# Patient Record
Sex: FEMALE | Race: WHITE | Employment: FULL TIME | URBAN - METROPOLITAN AREA
[De-identification: names, ages, dates, MRNs, and addresses within clinical notes are randomized per-mention and may not be internally consistent; named-entity substitution may affect disease eponyms.]

---

## 2022-07-08 NOTE — PROGRESS NOTES
Tiffany Chen, APRN-CNP  [facility]  [address] [address], [address]  [facility] [address]  Dept: [phone]  Dept Fax: [phone]     Patient ID: Ale Urbina is a 50 y.o. female. HPI    Ale Urbina is a 50 y.o. female New patient who presents to the office today for a first visit and to establish a relationship with a new primary care provider. Previous PCP: Lu Salazar NP  last seen: 2021    Today, the patient complains of left side of bra line and back, it is resolving about 5 weeks now. She thought maybe bed bugs, but after talking with someone and seeing the shape and size it was said to be shingles. She no longer has the pain and itching like she did before. It is getting better daily. - currently goes through menopause having really bad hot flashes happening almosts hourly she is taking lexapro, she is always feeling really bad. menstrual cycle every 3 months    Preventative care, female:  Last colonoscopy: [facility] years ago  Last mammogram: 2022 [facility]  Last PAP: Amrit Alcantar NP  Nicotine use: never  Alcohol use: yes, socially  Drug use: never  Dental exam: every 6 months   Eye exam: within 1 year     Specialists:  Endocrinology - Dr. Rosalee Millan     Previous office notes, labs, imaging and hospital records were reviewed prior to and during encounter. The patient's past medical, surgical, social, and family history as well as her current medications and allergies were reviewed as documented in today's encounter PASTOR Fuentes. No current outpatient medications on file prior to visit. No current facility-administered medications on file prior to visit. Subjective:     Review of Systems   Constitutional: Negative for activity change, fatigue and fever. Hot flashes   HENT: Negative for congestion, ear pain, rhinorrhea and sore throat. Respiratory: Negative for cough, chest tightness and shortness of breath. Cardiovascular: Negative for chest pain and palpitations. Gastrointestinal: Negative for abdominal distention, abdominal pain, constipation, diarrhea and nausea. Endocrine: Negative for polydipsia, polyphagia and polyuria. Genitourinary: Negative for difficulty urinating and dysuria. Musculoskeletal: Negative for arthralgias, back pain and myalgias. Skin: Negative for color change and rash. Neurological: Negative for dizziness, weakness, light-headedness and headaches. Hematological: Negative for adenopathy. Psychiatric/Behavioral: Positive for sleep disturbance (trouble staying asleep). Negative for agitation and behavioral problems. The patient is not nervous/anxious. Vitals:    07/12/22 0728   BP: 98/64   Pulse: 52   Resp: 16   Temp: 97.5 °F (36.4 °C)   SpO2: 99%     Objective:     Physical Exam  Vitals reviewed. Constitutional:       General: She is not in acute distress. Appearance: Normal appearance. HENT:      Head: Normocephalic and atraumatic. Right Ear: External ear normal.      Left Ear: External ear normal.      Nose: Nose normal.      Mouth/Throat:      Mouth: Mucous membranes are moist.      Pharynx: No oropharyngeal exudate or posterior oropharyngeal erythema. Eyes:      Extraocular Movements: Extraocular movements intact. Conjunctiva/sclera: Conjunctivae normal.      Pupils: Pupils are equal, round, and reactive to light. Cardiovascular:      Rate and Rhythm: Normal rate and regular rhythm. Pulses: Normal pulses. Heart sounds: Normal heart sounds. No murmur heard. Pulmonary:      Effort: Pulmonary effort is normal. No respiratory distress. Breath sounds: Normal breath sounds. No wheezing or rales. Abdominal:      General: Bowel sounds are normal. There is no distension. Palpations: Abdomen is soft. Tenderness: There is no abdominal tenderness. Musculoskeletal:         General: Normal range of motion.       Cervical back: Normal range of motion. Right lower leg: No edema. Left lower leg: No edema. Lymphadenopathy:      Cervical: No cervical adenopathy. Skin:     General: Skin is warm and dry. Findings: Rash present. Rash is vesicular. Neurological:      General: No focal deficit present. Mental Status: She is alert and oriented to person, place, and time. Deep Tendon Reflexes: Reflexes normal.   Psychiatric:         Mood and Affect: Mood normal.         Behavior: Behavior normal. Behavior is cooperative. Assessment:      Diagnosis Orders   1. Preventative health care  CBC    Comprehensive Metabolic Panel    Hemoglobin A1C    Lipid Panel   2. Vitamin D deficiency     3. Psychophysiological insomnia     4. Need for shingles vaccine  zoster recombinant adjuvanted vaccine [facility]) 50 MCG/0.5ML SUSR injection    DISCONTINUED: zoster recombinant adjuvanted vaccine [facility]) 50 MCG/0.5ML SUSR injection    DISCONTINUED: zoster recombinant adjuvanted vaccine (SHINGRIX) 50 MCG/0.5ML SUSR injection   5. Other specified hypothyroidism     6. Hot flashes     7. Herpes zoster without complication       Plan:     Vitamin D deficiency  - Stable: Medication re-filled as needed, con't medications as prescribed, con't current tx plan  - Continue Vitamin D supplements as ordered, will continue to follow Vitamin D level  - Please increase foods with Vitamin D, which include cheese, eggs, cereals, yogurt, milk, tofu, any type of beef, salmon or tuna,  spinach, and mushroom. - continue to follow up with Dr. Pam Gallagher. Psychophysiological insomnia  Hot flashes  - continue lexapro as ordered. - discussed trying magnesium glycinate for sleeping and primrose in the evening for hot flashes. Need for shingles vaccine  - pt instructed to wait at least 30 days from time rash has completely cleared. - zoster recombinant adjuvanted vaccine [facility]) 50 MCG/0.5ML SUSR injection;  Inject 0.5 mLs into the muscle See Admin Instructions 1 dose now and repeat in 2-6 months  Dispense: 0.5 mL; Refill: 1    Other specified hypothyroidism  - Stable: Medication re-filled as needed, con't medications as prescribed, con't current tx plan  - Continue with synthroid as previously prescribed. - continue to follow up with Dr. Nikolay Billingsley as recommended. Preventative health care  - We did discuss the recommended preventative screening guidelines including routine dental and eye exams.   - Detailed education was provided on the patient's after visit summary.  - Will order above noted labs and discuss them at follow up visit. - Will cont to follow with  Ob/GYN as instructed for routine PAP. - Annual mammograms as recommended. Herpes Zoster  - Keep covered and away from anyone pregnant   - Keep away from young children, until the rash crusts over. I did state they could have the potential to get chicken pox even though they were vaccinated    - pt verbalized understanding plan of care. Medications, labs, diagnostic studies, consultations and follow-up as documented in this encounter. Rest of systems unchanged, continue current treatments. On this date 7/12/2022 I have spent 30 minutes reviewing previous notes, test results and face to face with the patient discussing the diagnosis and importance of compliance with the treatment plan as well as documenting on the day of the visit. Noah Blair.  APRN-CNP

## 2022-07-12 ENCOUNTER — OFFICE VISIT (OUTPATIENT)
Dept: FAMILY MEDICINE CLINIC | Age: 52
End: 2022-07-12
Payer: COMMERCIAL

## 2022-07-12 VITALS
TEMPERATURE: 97.5 F | DIASTOLIC BLOOD PRESSURE: 64 MMHG | HEIGHT: 68 IN | RESPIRATION RATE: 16 BRPM | SYSTOLIC BLOOD PRESSURE: 98 MMHG | BODY MASS INDEX: 21.22 KG/M2 | OXYGEN SATURATION: 99 % | WEIGHT: 140 LBS | HEART RATE: 52 BPM

## 2022-07-12 DIAGNOSIS — Z23 NEED FOR SHINGLES VACCINE: ICD-10-CM

## 2022-07-12 DIAGNOSIS — B02.9 HERPES ZOSTER WITHOUT COMPLICATION: ICD-10-CM

## 2022-07-12 DIAGNOSIS — Z00.00 PREVENTATIVE HEALTH CARE: Primary | ICD-10-CM

## 2022-07-12 DIAGNOSIS — E55.9 VITAMIN D DEFICIENCY: ICD-10-CM

## 2022-07-12 DIAGNOSIS — R23.2 HOT FLASHES: ICD-10-CM

## 2022-07-12 DIAGNOSIS — E03.8 OTHER SPECIFIED HYPOTHYROIDISM: ICD-10-CM

## 2022-07-12 DIAGNOSIS — F51.04 PSYCHOPHYSIOLOGICAL INSOMNIA: ICD-10-CM

## 2022-07-12 PROBLEM — F32.A ANXIETY AND DEPRESSION: Status: ACTIVE | Noted: 2019-09-18

## 2022-07-12 PROBLEM — F41.9 ANXIETY AND DEPRESSION: Status: ACTIVE | Noted: 2019-09-18

## 2022-07-12 PROCEDURE — 99386 PREV VISIT NEW AGE 40-64: CPT | Performed by: NURSE PRACTITIONER

## 2022-07-12 RX ORDER — ZOSTER VACCINE RECOMBINANT, ADJUVANTED 50 MCG/0.5
0.5 KIT INTRAMUSCULAR SEE ADMIN INSTRUCTIONS
Qty: 0.5 ML | Refills: 1 | Status: SHIPPED | OUTPATIENT
Start: 2022-07-12 | End: 2022-07-12

## 2022-07-12 RX ORDER — LEVOTHYROXINE SODIUM 0.05 MG/1
50 TABLET ORAL DAILY
COMMUNITY
Start: 2022-06-11

## 2022-07-12 RX ORDER — ZOSTER VACCINE RECOMBINANT, ADJUVANTED 50 MCG/0.5
0.5 KIT INTRAMUSCULAR SEE ADMIN INSTRUCTIONS
Qty: 0.5 ML | Refills: 1 | Status: SHIPPED | OUTPATIENT
Start: 2022-07-12 | End: 2023-01-08

## 2022-07-12 RX ORDER — ESCITALOPRAM OXALATE 10 MG/1
5 TABLET ORAL DAILY
COMMUNITY
Start: 2022-06-16 | End: 2022-09-20 | Stop reason: SDUPTHER

## 2022-07-12 SDOH — ECONOMIC STABILITY: FOOD INSECURITY: WITHIN THE PAST 12 MONTHS, YOU WORRIED THAT YOUR FOOD WOULD RUN OUT BEFORE YOU GOT MONEY TO BUY MORE.: NEVER TRUE

## 2022-07-12 SDOH — ECONOMIC STABILITY: FOOD INSECURITY: WITHIN THE PAST 12 MONTHS, THE FOOD YOU BOUGHT JUST DIDN'T LAST AND YOU DIDN'T HAVE MONEY TO GET MORE.: NEVER TRUE

## 2022-07-12 ASSESSMENT — PATIENT HEALTH QUESTIONNAIRE - PHQ9
9. THOUGHTS THAT YOU WOULD BE BETTER OFF DEAD, OR OF HURTING YOURSELF: 0
SUM OF ALL RESPONSES TO PHQ QUESTIONS 1-9: 0
1. LITTLE INTEREST OR PLEASURE IN DOING THINGS: 0
2. FEELING DOWN, DEPRESSED OR HOPELESS: 0
7. TROUBLE CONCENTRATING ON THINGS, SUCH AS READING THE NEWSPAPER OR WATCHING TELEVISION: 0
SUM OF ALL RESPONSES TO PHQ QUESTIONS 1-9: 0
SUM OF ALL RESPONSES TO PHQ9 QUESTIONS 1 & 2: 0
SUM OF ALL RESPONSES TO PHQ QUESTIONS 1-9: 0
10. IF YOU CHECKED OFF ANY PROBLEMS, HOW DIFFICULT HAVE THESE PROBLEMS MADE IT FOR YOU TO DO YOUR WORK, TAKE CARE OF THINGS AT HOME, OR GET ALONG WITH OTHER PEOPLE: 0
4. FEELING TIRED OR HAVING LITTLE ENERGY: 0
5. POOR APPETITE OR OVEREATING: 0
6. FEELING BAD ABOUT YOURSELF - OR THAT YOU ARE A FAILURE OR HAVE LET YOURSELF OR YOUR FAMILY DOWN: 0
SUM OF ALL RESPONSES TO PHQ QUESTIONS 1-9: 0
8. MOVING OR SPEAKING SO SLOWLY THAT OTHER PEOPLE COULD HAVE NOTICED. OR THE OPPOSITE, BEING SO FIGETY OR RESTLESS THAT YOU HAVE BEEN MOVING AROUND A LOT MORE THAN USUAL: 0
3. TROUBLE FALLING OR STAYING ASLEEP: 0

## 2022-07-12 ASSESSMENT — ENCOUNTER SYMPTOMS
SHORTNESS OF BREATH: 0
COUGH: 0
NAUSEA: 0
CHEST TIGHTNESS: 0
ABDOMINAL PAIN: 0
SORE THROAT: 0
BACK PAIN: 0
ABDOMINAL DISTENTION: 0
COLOR CHANGE: 0
RHINORRHEA: 0
CONSTIPATION: 0
DIARRHEA: 0

## 2022-07-12 ASSESSMENT — SOCIAL DETERMINANTS OF HEALTH (SDOH): HOW HARD IS IT FOR YOU TO PAY FOR THE VERY BASICS LIKE FOOD, HOUSING, MEDICAL CARE, AND HEATING?: NOT HARD AT ALL

## 2022-07-12 NOTE — PATIENT INSTRUCTIONS
Health Maintenance Recommendations  Exercise   · I generally recommend that people of all ages try to get 150 minutes of physical activity per week and it doesnt matter how this totals up, in other words 30 minutes 5 days per week is as good as 50 minutes 3 days a week and so on. · The level of activity should be such that it is able to get your heart rate up to 100 or more, for example a brisk walk should achieve this rate. Dietary Recommendations  · In terms of diet, I generally recommend trying to eat a healthy well balanced diet full of fruits and vegetables. Avoid carbonated drinks and fruit juices and limit your alcohol use. · Avoid processed foods wherever possible (anything that comes in a can or a box) which can be achieved by sticking to the outside walls of the grocery store where generally you will find fresh fruits/vegetables, meats, dairy, and frozen foods. · Try to avoid starches in the diet where possible and minimize bread, rice, potatoes, and pasta in the diet. Specifically try to avoid gluten, which even in people that dont have a vinod allergy, causes havoc in the small intestine and alters absorption of nutrients which can in turn lead to obesity. Sleep  · Try to achieve a regular sleep schedule, waking and laying down at the same time each night. Most people need 7 hours per night plus or minus 2 hours. · You will know that youre getting enough because you will wake feeling refreshed and not need to sleep in to catch up on weekends. Skin Care  · Make sure that you dont neglect your skin. · Play it safe in the sun. Use a sunblock on all of your exposed skin. · The sunblock should be broad spectrum and water resistant. · I do recommend an SPF 30 or higher sun screen any time that you plan to be in the sun for more than 20 minutes, even in the winter or on cloudy days (keep in mind that UV light penetrates clouds and can cause burns even on cloudy days).    · Apply 20 to 30 minutes before going out in the sun. Reapply sunblock every 2 hours and after swimming or sweating. Judyn Rafat can also damage your skin on cool, windy days. Clouds and fog do not filter UV light. Make sure you reapply sunblock every 2 hours. · Avoid the sun in the middle of the day, between 10 AM to 4 PM. Your unprotected skin can be damaged in 15 minutes with direct sun exposure. Personal Health  · If you smoke, STOP. There are many resources available to help you successfully quit. · If you are sexually active, always practice safe sex and wear a condom. · See a dentist every 6 months. · See an eye doctor regularly. · Always wear a seat belt while in car. · Get a flu vaccine annually. · Get a tetanus booster vaccine every 10 years. Psychosocial Health  · Finally, make sure that you always have something to look forward to whether this is a vacation, a special event, or just a weekend off work. Having something to look forward to helps to maintain positive focus and is good for mental health.

## 2022-07-19 LAB
ALBUMIN SERPL-MCNC: 4.7 G/DL
ALP BLD-CCNC: 75 U/L
ALT SERPL-CCNC: 12 U/L
ANION GAP SERPL CALCULATED.3IONS-SCNC: 9 MMOL/L
AST SERPL-CCNC: 18 U/L
AVERAGE GLUCOSE: 108
BASOPHILS ABSOLUTE: NORMAL
BASOPHILS RELATIVE PERCENT: NORMAL
BILIRUB SERPL-MCNC: 0.5 MG/DL (ref 0.1–1.4)
BUN BLDV-MCNC: 17 MG/DL
CALCIUM SERPL-MCNC: 9.9 MG/DL
CHLORIDE BLD-SCNC: 104 MMOL/L
CHOLESTEROL, TOTAL: 193 MG/DL
CHOLESTEROL/HDL RATIO: 3.2
CO2: 28 MMOL/L
CREAT SERPL-MCNC: 0.81 MG/DL
EOSINOPHILS ABSOLUTE: NORMAL
EOSINOPHILS RELATIVE PERCENT: NORMAL
GFR CALCULATED: 88
GLUCOSE BLD-MCNC: 91 MG/DL
HBA1C MFR BLD: 5.4 %
HCT VFR BLD CALC: 37.9 % (ref 36–46)
HDLC SERPL-MCNC: 60 MG/DL (ref 35–70)
HEMOGLOBIN: 12.5 G/DL (ref 12–16)
LDL CHOLESTEROL CALCULATED: 119 MG/DL (ref 0–160)
LYMPHOCYTES ABSOLUTE: NORMAL
LYMPHOCYTES RELATIVE PERCENT: NORMAL
MCH RBC QN AUTO: NORMAL PG
MCHC RBC AUTO-ENTMCNC: NORMAL G/DL
MCV RBC AUTO: NORMAL FL
MONOCYTES ABSOLUTE: NORMAL
MONOCYTES RELATIVE PERCENT: NORMAL
NEUTROPHILS ABSOLUTE: NORMAL
NEUTROPHILS RELATIVE PERCENT: NORMAL
NONHDLC SERPL-MCNC: NORMAL MG/DL
PLATELET # BLD: 246 K/ΜL
PMV BLD AUTO: NORMAL FL
POTASSIUM SERPL-SCNC: 4.8 MMOL/L
RBC # BLD: NORMAL 10*6/UL
SODIUM BLD-SCNC: 141 MMOL/L
TOTAL PROTEIN: 7.3
TRIGL SERPL-MCNC: 69 MG/DL
VLDLC SERPL CALC-MCNC: 14 MG/DL
WBC # BLD: 3.5 10^3/ML

## 2022-07-21 DIAGNOSIS — Z00.00 PREVENTATIVE HEALTH CARE: ICD-10-CM

## 2022-09-20 RX ORDER — ESCITALOPRAM OXALATE 10 MG/1
5 TABLET ORAL DAILY
Qty: 30 TABLET | Refills: 2 | Status: SHIPPED | OUTPATIENT
Start: 2022-09-20 | End: 2022-10-18 | Stop reason: DRUGHIGH

## 2022-10-18 ENCOUNTER — TELEPHONE (OUTPATIENT)
Dept: FAMILY MEDICINE CLINIC | Age: 52
End: 2022-10-18

## 2022-10-18 RX ORDER — ESCITALOPRAM OXALATE 10 MG/1
10 TABLET ORAL DAILY
Qty: 30 TABLET | Refills: 2 | Status: SHIPPED | OUTPATIENT
Start: 2022-10-18 | End: 2022-10-31 | Stop reason: SDUPTHER

## 2022-10-18 NOTE — TELEPHONE ENCOUNTER
Yes she can take 1 tablet daily and then I also sent over an updated prescription if needed.  Please have her schedule a virtual visit in 4-6 weeks to see how she is doing with the increase dosage

## 2022-10-18 NOTE — TELEPHONE ENCOUNTER
Patient called in and states that she has been taking 0.5 tablet of the lexapro. She is wanting to know if she would be able to increase that and take a full tablet.

## 2022-10-31 RX ORDER — ESCITALOPRAM OXALATE 10 MG/1
10 TABLET ORAL DAILY
Qty: 30 TABLET | Refills: 2 | Status: SHIPPED | OUTPATIENT
Start: 2022-10-31 | End: 2022-11-23 | Stop reason: SDUPTHER

## 2022-10-31 NOTE — TELEPHONE ENCOUNTER
Please Approve or Refuse.   Send to Pharmacy per Pt's Request:      Next Visit Date:  11/23/2022   Last Visit Date: 7/12/2022    Hemoglobin A1C (%)   Date Value   07/19/2022 5.4             ( goal A1C is < 7)   BP Readings from Last 3 Encounters:   07/12/22 98/64          (goal 120/80)  BUN   Date Value Ref Range Status   07/19/2022 17 mg/dL Final     Creatinine   Date Value Ref Range Status   07/19/2022 0.81  Final     Potassium   Date Value Ref Range Status   07/19/2022 4.8 mmol/L Final

## 2022-11-22 NOTE — PROGRESS NOTES
Cecilio Shi, APRN-CNP  [facility]  [address] [address], [address]  [facility] [address]  Dept: [phone]  Dept Fax: [phone]     Patient ID: Shawnee Izaguirre is a 50 y.o. female Established patient    HPI    Pt here today for follow up on anxiety, depression, hot flashes and medication check. She is doing well on the lexapro 10 mg and would like to keep it at that. It has helped with her hot flashes.     - she is having a lot of aches in all joints, fatigue and has tried supplements but they are working.   - when she was pregnant with dtr she was diagnosis with rheumatoid arthritis, she was told she had the symptoms of it but the labs did not show positive. she was put on medication (methotrexate and something else) but was afraid of long term effects so stopped taking them, pt states she was doing well but seems to be getting out of control again. She is also noticing  brain fog. She recently got Thyroid check and has appt next week for that. - she had to stop running due to the pain in lower extremities. Pt denies any fever or chills. Pt today denies any HA, chest pain, or SOB. Pt denies any N/V/D/C or abdominal pain. Otherwise pt doing well on current tx and no other concerns today. The patient's past medical, surgical, social, and family history as well as his current medications and allergies were reviewed as documented in today's encounter by PASTOR Ricketts. Previous office notes, labs, imaging and hospital records were reviewed prior to and during encounter.     Current Outpatient Medications on File Prior to Visit   Medication Sig Dispense Refill    escitalopram (LEXAPRO) 10 MG tablet Take 1 tablet by mouth daily 30 tablet 2    levothyroxine (SYNTHROID) 50 MCG tablet Take 50 mcg by mouth daily      zoster recombinant adjuvanted vaccine (SHINGRIX) 50 MCG/0.5ML SUSR injection Inject 0.5 mLs into the muscle See Admin Instructions 1 dose now and repeat in 2-6 months 0.5 mL 1     No current facility-administered medications on file prior to visit. Subjective:     Review of Systems   Constitutional:  Negative for activity change, fatigue and fever. HENT:  Negative for congestion, ear pain, rhinorrhea and sore throat. Respiratory:  Negative for cough, chest tightness and shortness of breath. Cardiovascular:  Negative for chest pain and palpitations. Gastrointestinal:  Negative for abdominal distention, abdominal pain, constipation, diarrhea and nausea. Endocrine: Negative for polydipsia, polyphagia and polyuria. Genitourinary:  Negative for difficulty urinating and dysuria. Musculoskeletal:  Positive for arthralgias and myalgias. Negative for back pain and joint swelling. Skin:  Negative for color change and rash. Neurological:  Negative for dizziness, weakness, light-headedness and headaches. Hematological:  Negative for adenopathy. Psychiatric/Behavioral:  Negative for agitation and behavioral problems. The patient is not nervous/anxious. Vitals:    11/23/22 0848   BP: 114/72   Pulse: 72   Resp: 16   Temp: 97.6 °F (36.4 °C)   SpO2: 96%       Objective:     Physical Exam  Vitals reviewed. Constitutional:       General: She is not in acute distress. Appearance: Normal appearance. HENT:      Head: Normocephalic and atraumatic. Right Ear: External ear normal.      Left Ear: External ear normal.      Nose: Nose normal.      Mouth/Throat:      Mouth: Mucous membranes are moist.      Pharynx: No oropharyngeal exudate or posterior oropharyngeal erythema. Eyes:      Extraocular Movements: Extraocular movements intact. Conjunctiva/sclera: Conjunctivae normal.      Pupils: Pupils are equal, round, and reactive to light. Cardiovascular:      Rate and Rhythm: Normal rate and regular rhythm. Pulses: Normal pulses. Heart sounds: Normal heart sounds. No murmur heard.   Pulmonary:      Effort: Pulmonary effort is normal. No respiratory distress. Breath sounds: Normal breath sounds. No wheezing or rales. Abdominal:      General: Bowel sounds are normal. There is no distension. Palpations: Abdomen is soft. Tenderness: There is no abdominal tenderness. Musculoskeletal:         General: Normal range of motion. Cervical back: Normal range of motion. Right lower leg: No edema. Left lower leg: No edema. Comments: Pain/ tenderness to back of hip joint, elbows, wrist, ankles and knees   Lymphadenopathy:      Cervical: No cervical adenopathy. Skin:     General: Skin is warm and dry. Neurological:      General: No focal deficit present. Mental Status: She is alert and oriented to person, place, and time. Deep Tendon Reflexes: Reflexes normal.   Psychiatric:         Mood and Affect: Mood normal.         Behavior: Behavior normal. Behavior is cooperative. Assessment:      Diagnosis Orders   1. Psychophysiological insomnia        2. Anxiety and depression  escitalopram (LEXAPRO) 10 MG tablet      3. Hot flashes        4. Preventative health care  CBC    Comprehensive Metabolic Panel    Lipid Panel      5. Vitamin D deficiency  Vitamin D 25 Hydroxy      6. Need for influenza vaccination  Influenza, FLUCELVAX, (age 10 mo+), IM, Preservative Free, 0.5 mL      7. Arthralgia, unspecified joint  MEHUL Screen with Reflex    C-Reactive Protein    Cyclic Citrul Peptide Antibody, IgG    Rheumatoid Factor    Sedimentation Rate    HLA-B27 Antigen          Plan:     Psychophysiological insomnia  Hot flashes  Anxiety and depression  - Stable: Medication re-filled as needed, con't medications as prescribed, con't current tx plan  - Continue with lexapro as previously prescribed. - Pt encouraged to deep breath and relax through anxious moments   - Offered reassurance and allowed to ventilate feelings and ask questions.   - Non-pharmological coping methods discussed.     - discussed trying magnesium glycinate for sleeping and primrose in the evening for hot flashes. Vitamin D deficiency   - Continue Vitamin D supplements as ordered, will re-check Vitamin D level    Need for influenza vaccination  After obtaining informed consent, the immunization is given by Manning Opitz, RMA. Arthralgia, unspecified joint  - will get labs to further evaluate. - pt verbalized understanding plan of care. Medications, labs, diagnostic studies, consultations and follow-up as documented in this encounter. Rest of systems unchanged, continue current treatments    On this date 11/23/2022 I have spent 30 minutes reviewing previous notes, test results and face to face with the patient discussing the diagnosis and importance of compliance with the treatment plan as well as documenting on the day of the visit.     KAELA Mazariegos-CNP

## 2022-11-23 ENCOUNTER — OFFICE VISIT (OUTPATIENT)
Dept: FAMILY MEDICINE CLINIC | Age: 52
End: 2022-11-23
Payer: COMMERCIAL

## 2022-11-23 VITALS
DIASTOLIC BLOOD PRESSURE: 72 MMHG | WEIGHT: 146 LBS | SYSTOLIC BLOOD PRESSURE: 114 MMHG | RESPIRATION RATE: 16 BRPM | TEMPERATURE: 97.6 F | BODY MASS INDEX: 22.2 KG/M2 | OXYGEN SATURATION: 96 % | HEART RATE: 72 BPM

## 2022-11-23 DIAGNOSIS — Z23 NEED FOR INFLUENZA VACCINATION: ICD-10-CM

## 2022-11-23 DIAGNOSIS — F51.04 PSYCHOPHYSIOLOGICAL INSOMNIA: Primary | ICD-10-CM

## 2022-11-23 DIAGNOSIS — E55.9 VITAMIN D DEFICIENCY: ICD-10-CM

## 2022-11-23 DIAGNOSIS — R23.2 HOT FLASHES: ICD-10-CM

## 2022-11-23 DIAGNOSIS — F41.9 ANXIETY AND DEPRESSION: ICD-10-CM

## 2022-11-23 DIAGNOSIS — F32.A ANXIETY AND DEPRESSION: ICD-10-CM

## 2022-11-23 DIAGNOSIS — Z00.00 PREVENTATIVE HEALTH CARE: ICD-10-CM

## 2022-11-23 DIAGNOSIS — M25.50 ARTHRALGIA, UNSPECIFIED JOINT: ICD-10-CM

## 2022-11-23 PROCEDURE — 90471 IMMUNIZATION ADMIN: CPT | Performed by: NURSE PRACTITIONER

## 2022-11-23 PROCEDURE — 99214 OFFICE O/P EST MOD 30 MIN: CPT | Performed by: NURSE PRACTITIONER

## 2022-11-23 PROCEDURE — 90674 CCIIV4 VAC NO PRSV 0.5 ML IM: CPT | Performed by: NURSE PRACTITIONER

## 2022-11-23 RX ORDER — ESCITALOPRAM OXALATE 10 MG/1
10 TABLET ORAL DAILY
Qty: 90 TABLET | Refills: 1 | Status: SHIPPED | OUTPATIENT
Start: 2022-11-23

## 2022-11-23 ASSESSMENT — ENCOUNTER SYMPTOMS
BACK PAIN: 0
RHINORRHEA: 0
SORE THROAT: 0
COUGH: 0
COLOR CHANGE: 0
ABDOMINAL DISTENTION: 0
DIARRHEA: 0
CHEST TIGHTNESS: 0
ABDOMINAL PAIN: 0
CONSTIPATION: 0
SHORTNESS OF BREATH: 0
NAUSEA: 0

## 2022-12-10 LAB
ALBUMIN SERPL-MCNC: 4.9 G/DL
ALP BLD-CCNC: 82 U/L
ALT SERPL-CCNC: 12 U/L
ANION GAP SERPL CALCULATED.3IONS-SCNC: 8 MMOL/L
AST SERPL-CCNC: 18 U/L
BASOPHILS ABSOLUTE: NORMAL
BASOPHILS RELATIVE PERCENT: NORMAL
BILIRUB SERPL-MCNC: 0.5 MG/DL (ref 0.1–1.4)
BUN BLDV-MCNC: 16 MG/DL
C-REACTIVE PROTEIN: <0.3
CALCIUM SERPL-MCNC: 9.8 MG/DL
CHLORIDE BLD-SCNC: 102 MMOL/L
CHOLESTEROL, TOTAL: 217 MG/DL
CHOLESTEROL/HDL RATIO: 3.6
CO2: 30 MMOL/L
CREAT SERPL-MCNC: 0.77 MG/DL
EOSINOPHILS ABSOLUTE: NORMAL
EOSINOPHILS RELATIVE PERCENT: NORMAL
GFR CALCULATED: 94
GLUCOSE BLD-MCNC: 88 MG/DL
HCT VFR BLD CALC: 39.5 % (ref 36–46)
HDLC SERPL-MCNC: 60 MG/DL (ref 35–70)
HEMOGLOBIN: 13.2 G/DL (ref 12–16)
LDL CHOLESTEROL CALCULATED: 142 MG/DL (ref 0–160)
LYMPHOCYTES ABSOLUTE: NORMAL
LYMPHOCYTES RELATIVE PERCENT: NORMAL
MCH RBC QN AUTO: NORMAL PG
MCHC RBC AUTO-ENTMCNC: NORMAL G/DL
MCV RBC AUTO: NORMAL FL
MONOCYTES ABSOLUTE: NORMAL
MONOCYTES RELATIVE PERCENT: NORMAL
NEUTROPHILS ABSOLUTE: NORMAL
NEUTROPHILS RELATIVE PERCENT: NORMAL
NONHDLC SERPL-MCNC: NORMAL MG/DL
PLATELET # BLD: 238 K/ΜL
PMV BLD AUTO: NORMAL FL
POTASSIUM SERPL-SCNC: 4.3 MMOL/L
RBC # BLD: NORMAL 10*6/UL
RHEUMATOID FACTOR: <10
SEDIMENTATION RATE, ERYTHROCYTE: 8
SODIUM BLD-SCNC: 140 MMOL/L
TOTAL PROTEIN: 7.6
TRIGL SERPL-MCNC: 77 MG/DL
VITAMIN D 25-HYDROXY: 59
VITAMIN D2, 25 HYDROXY: NORMAL
VITAMIN D3,25 HYDROXY: NORMAL
VLDLC SERPL CALC-MCNC: 15 MG/DL
WBC # BLD: 4.1 10^3/ML

## 2022-12-13 DIAGNOSIS — Z00.00 PREVENTATIVE HEALTH CARE: ICD-10-CM

## 2022-12-13 DIAGNOSIS — E55.9 VITAMIN D DEFICIENCY: ICD-10-CM

## 2022-12-13 DIAGNOSIS — M25.50 ARTHRALGIA, UNSPECIFIED JOINT: ICD-10-CM

## 2022-12-27 ENCOUNTER — OFFICE VISIT (OUTPATIENT)
Dept: FAMILY MEDICINE CLINIC | Age: 52
End: 2022-12-27
Payer: COMMERCIAL

## 2022-12-27 ENCOUNTER — TELEPHONE (OUTPATIENT)
Dept: FAMILY MEDICINE CLINIC | Age: 52
End: 2022-12-27

## 2022-12-27 VITALS
WEIGHT: 143 LBS | BODY MASS INDEX: 21.74 KG/M2 | TEMPERATURE: 97.1 F | DIASTOLIC BLOOD PRESSURE: 78 MMHG | OXYGEN SATURATION: 97 % | SYSTOLIC BLOOD PRESSURE: 120 MMHG | HEART RATE: 61 BPM

## 2022-12-27 DIAGNOSIS — F32.A ANXIETY AND DEPRESSION: ICD-10-CM

## 2022-12-27 DIAGNOSIS — R23.2 HOT FLASHES: ICD-10-CM

## 2022-12-27 DIAGNOSIS — E03.8 OTHER SPECIFIED HYPOTHYROIDISM: ICD-10-CM

## 2022-12-27 DIAGNOSIS — F41.9 ANXIETY AND DEPRESSION: ICD-10-CM

## 2022-12-27 DIAGNOSIS — M25.50 ARTHRALGIA, UNSPECIFIED JOINT: ICD-10-CM

## 2022-12-27 DIAGNOSIS — F51.04 PSYCHOPHYSIOLOGICAL INSOMNIA: Primary | ICD-10-CM

## 2022-12-27 DIAGNOSIS — E55.9 VITAMIN D DEFICIENCY: ICD-10-CM

## 2022-12-27 PROCEDURE — 99214 OFFICE O/P EST MOD 30 MIN: CPT | Performed by: NURSE PRACTITIONER

## 2022-12-27 RX ORDER — MELOXICAM 7.5 MG/1
7.5 TABLET ORAL DAILY
Qty: 90 TABLET | Refills: 1 | Status: SHIPPED | OUTPATIENT
Start: 2022-12-27

## 2022-12-27 RX ORDER — LEVOTHYROXINE SODIUM 0.05 MG/1
50 TABLET ORAL DAILY
Qty: 30 TABLET | Refills: 2 | Status: SHIPPED | OUTPATIENT
Start: 2022-12-27

## 2022-12-27 ASSESSMENT — ENCOUNTER SYMPTOMS
ABDOMINAL DISTENTION: 0
CONSTIPATION: 0
COLOR CHANGE: 0
SHORTNESS OF BREATH: 0
BACK PAIN: 0
CHEST TIGHTNESS: 0
NAUSEA: 0
SORE THROAT: 0
ABDOMINAL PAIN: 0
COUGH: 0
DIARRHEA: 0
RHINORRHEA: 0

## 2022-12-27 NOTE — PROGRESS NOTES
Cecilio Shi, APRN-[facility]  [address] [address], [address]  [facility] [address]  Dept: [phone]  Dept Fax: [phone]     Patient ID: Shawnee Izaguirre is a 50 y.o. female Established patient    HPI    Pt here today for f/u, go over labs and/or diagnostic studies, and medication refills. Pt denies any fever or chills. Pt today denies any HA, chest pain, or SOB. Pt denies any N/V/D/C or abdominal pain. - still having joint pain but it has improved with doing hot yoga with her dtr this last month. Some days are better than others. - am: vitamin D 50 mcg, turkey tail, primrose  - pm: turmeric, vitamin E, magnesium    Otherwise pt doing well on current tx and no other concerns today. The patient's past medical, surgical, social, and family history as well as his current medications and allergies were reviewed as documented in today's encounter by Harpreet Best. Previous office notes, labs, imaging and hospital records were reviewed prior to and during encounter. Current Outpatient Medications on File Prior to Visit   Medication Sig Dispense Refill    escitalopram (LEXAPRO) 10 MG tablet Take 1 tablet by mouth daily 90 tablet 1    levothyroxine (SYNTHROID) 50 MCG tablet Take 50 mcg by mouth daily      zoster recombinant adjuvanted vaccine [facility]) 50 MCG/0.5ML SUSR injection Inject 0.5 mLs into the muscle See Admin Instructions 1 dose now and repeat in 2-6 months (Patient not taking: Reported on 11/23/2022) 0.5 mL 1     No current facility-administered medications on file prior to visit. Subjective:     Review of Systems   Constitutional:  Negative for activity change, fatigue and fever. HENT:  Negative for congestion, ear pain, rhinorrhea and sore throat. Respiratory:  Negative for cough, chest tightness and shortness of breath. Cardiovascular:  Negative for chest pain and palpitations.    Gastrointestinal:  Negative for abdominal General: Skin is warm and dry. Neurological:      General: No focal deficit present. Mental Status: She is alert and oriented to person, place, and time. Deep Tendon Reflexes: Reflexes normal.   Psychiatric:         Mood and Affect: Mood normal.         Behavior: Behavior normal. Behavior is cooperative. Assessment:      Diagnosis Orders   1. Psychophysiological insomnia        2. Hot flashes        3. Anxiety and depression        4. Vitamin D deficiency        5. Arthralgia, unspecified joint  meloxicam (MOBIC) 7.5 MG tablet      6. Other specified hypothyroidism  levothyroxine (SYNTHROID) 50 MCG tablet        Plan:     Psychophysiological insomnia  Hot flashes  Anxiety and depression  - Stable: Medication re-filled as needed, con't medications as prescribed, con't current tx plan  - Continue with lexapro as previously prescribed. - Pt encouraged to deep breath and relax through anxious moments   - Offered reassurance and allowed to ventilate feelings and ask questions.   - Non-pharmological coping methods discussed. - discussed trying magnesium glycinate for sleeping and primrose in the evening for hot flashes. Vitamin D deficiency   - Continue Vitamin D supplements as ordered, will re-check Vitamin D level     Arthralgia, unspecified joint  - labs stable   - will start meloxicam daily. - follow up in 6 weeks to evaluate how pain is doing.  - discussed referral for rheumatology if symptoms worsen or do not improve. Other specified hypothyroidism  - Stable: Medication re-filled as needed, con't medications as prescribed, con't current tx plan  - Continue with synthroid as previously prescribed. - requested most recent labs through path labs. - pt verbalized understanding plan of care. Medications, labs, diagnostic studies, consultations and follow-up as documented in this encounter.  Rest of systems unchanged, continue current treatments    On this date 12/27/2022 I have spent 30 minutes reviewing previous notes, test results and face to face with the patient discussing the diagnosis and importance of compliance with the treatment plan as well as documenting on the day of the visit.     Niko Roth, APRN-CNP

## 2022-12-27 NOTE — TELEPHONE ENCOUNTER
----- Message from KAELA Yanes CNP sent at 12/27/2022  3:17 PM EST -----  Can we try and call and get her TSH and T4 results from path labs in [place].   Thank you,   ИВАН aYnes  [address] [facility]  [address] [address] [facility] [address]  [address] [address]  Dept: [phone]  Dept Fax: [phone]

## 2023-02-27 ENCOUNTER — TELEMEDICINE (OUTPATIENT)
Dept: FAMILY MEDICINE CLINIC | Age: 53
End: 2023-02-27
Payer: COMMERCIAL

## 2023-02-27 DIAGNOSIS — F51.04 PSYCHOPHYSIOLOGICAL INSOMNIA: ICD-10-CM

## 2023-02-27 DIAGNOSIS — E03.8 OTHER SPECIFIED HYPOTHYROIDISM: ICD-10-CM

## 2023-02-27 DIAGNOSIS — R23.2 HOT FLASHES: ICD-10-CM

## 2023-02-27 DIAGNOSIS — F32.A ANXIETY AND DEPRESSION: ICD-10-CM

## 2023-02-27 DIAGNOSIS — M25.50 ARTHRALGIA, UNSPECIFIED JOINT: ICD-10-CM

## 2023-02-27 DIAGNOSIS — F41.9 ANXIETY AND DEPRESSION: ICD-10-CM

## 2023-02-27 DIAGNOSIS — E78.00 ELEVATED CHOLESTEROL: Primary | ICD-10-CM

## 2023-02-27 PROCEDURE — 99213 OFFICE O/P EST LOW 20 MIN: CPT | Performed by: NURSE PRACTITIONER

## 2023-02-27 SDOH — ECONOMIC STABILITY: FOOD INSECURITY: WITHIN THE PAST 12 MONTHS, YOU WORRIED THAT YOUR FOOD WOULD RUN OUT BEFORE YOU GOT MONEY TO BUY MORE.: NEVER TRUE

## 2023-02-27 SDOH — ECONOMIC STABILITY: HOUSING INSECURITY
IN THE LAST 12 MONTHS, WAS THERE A TIME WHEN YOU DID NOT HAVE A STEADY PLACE TO SLEEP OR SLEPT IN A SHELTER (INCLUDING NOW)?: NO

## 2023-02-27 SDOH — ECONOMIC STABILITY: FOOD INSECURITY: WITHIN THE PAST 12 MONTHS, THE FOOD YOU BOUGHT JUST DIDN'T LAST AND YOU DIDN'T HAVE MONEY TO GET MORE.: NEVER TRUE

## 2023-02-27 SDOH — ECONOMIC STABILITY: INCOME INSECURITY: HOW HARD IS IT FOR YOU TO PAY FOR THE VERY BASICS LIKE FOOD, HOUSING, MEDICAL CARE, AND HEATING?: NOT HARD AT ALL

## 2023-02-27 SDOH — ECONOMIC STABILITY: TRANSPORTATION INSECURITY
IN THE PAST 12 MONTHS, HAS LACK OF TRANSPORTATION KEPT YOU FROM MEETINGS, WORK, OR FROM GETTING THINGS NEEDED FOR DAILY LIVING?: NO

## 2023-02-27 ASSESSMENT — ENCOUNTER SYMPTOMS
SORE THROAT: 0
ABDOMINAL DISTENTION: 0
RHINORRHEA: 0
NAUSEA: 0
CONSTIPATION: 0
CHEST TIGHTNESS: 0
DIARRHEA: 0
ABDOMINAL PAIN: 0
COLOR CHANGE: 0
COUGH: 0
SHORTNESS OF BREATH: 0
BACK PAIN: 0

## 2023-02-27 NOTE — PROGRESS NOTES
Marilin Ritchie, APRN-CNP  [facility]  [address] [address], [address]  [address] [address]  Dept: [phone]  Dept Fax: [phone]    Joel Boudreaux (:  1970) is a Established patient, here for evaluation of the following: The mobic is helping and the pains are much better. - she is seeing endocrinology during spring break   Hot flashes trying morning primrose, black cohosh and not helping at this time    Assessment & Plan   Below is the assessment and plan developed based on review of pertinent history, physical exam, labs, studies, and medications. Elevated cholesterol  - Lifestyle changes: Decrease fats, sugars, carbohydrates, and increase routine exercise, try to get 150 minutes of aerobic activity a week and watch calorie portions and to limit her carbs in her diet. - Foods that helps increase HDL include the following: Fish, nuts, flax, avocados, and legumes (such as soy, kidney beans, chickpeas). - Will cont with low fat/chol diet. Hot flashes  - recommend Remifemin OTC for hot flashes. Arthralgia, unspecified joint  - Stable: Medication re-filled as needed, con't medications as prescribed, con't current tx plan  - continue mobic as previously prescribed. Anxiety and depression  Psychophysiological insomnia  - Stable: Medication re-filled as needed, con't medications as prescribed, con't current tx plan  - Continue with lexapro as previously prescribed. - Pt encouraged to deep breath and relax through anxious moments   - Offered reassurance and allowed to ventilate feelings and ask questions.   - Non-pharmological coping methods discussed. - discussed trying magnesium glycinate for sleeping. Other specified hypothyroidism  - Stable: Medication re-filled as needed, con't medications as prescribed, con't current tx plan  - Continue with synthroid as previously prescribed. - continue with endocrinology as scheduled.      Return in about 6 months (around 8/27/2023) for arthralgia, hot flashes and lipids . Subjective   HPI  Review of Systems   Constitutional:  Negative for activity change, fatigue and fever. HENT:  Negative for congestion, ear pain, rhinorrhea and sore throat. Respiratory:  Negative for cough, chest tightness and shortness of breath. Cardiovascular:  Negative for chest pain and palpitations. Gastrointestinal:  Negative for abdominal distention, abdominal pain, constipation, diarrhea and nausea. Endocrine: Negative for polydipsia, polyphagia and polyuria. Genitourinary:  Negative for difficulty urinating and dysuria. Musculoskeletal:  Negative for arthralgias, back pain and myalgias. Skin:  Negative for color change and rash. Neurological:  Negative for dizziness, weakness, light-headedness and headaches. Hematological:  Negative for adenopathy. Psychiatric/Behavioral:  Negative for agitation and behavioral problems. The patient is not nervous/anxious.        Objective   [INSTRUCTIONS:  \"[x]\" Indicates a positive item  \"[]\" Indicates a negative item  -- DELETE ALL ITEMS NOT EXAMINED]    Constitutional: [x] Appears well-developed and well-nourished [x] No apparent distress      [] Abnormal -     Mental status: [x] Alert and awake  [x] Oriented to person/place/time [x] Able to follow commands    [] Abnormal -     Eyes:   EOM    [x]  Normal    [] Abnormal -   Sclera  [x]  Normal    [] Abnormal -          Discharge [x]  None visible   [] Abnormal -     HENT: [x] Normocephalic, atraumatic  [] Abnormal -   [x] Mouth/Throat: Mucous membranes are moist    External Ears [x] Normal  [] Abnormal -    Neck: [x] No visualized mass [] Abnormal -     Pulmonary/Chest: [x] Respiratory effort normal   [x] No visualized signs of difficulty breathing or respiratory distress        [] Abnormal -      Musculoskeletal:   [x] Normal gait with no signs of ataxia         [x] Normal range of motion of neck        [] Abnormal -     Neurological:        [x] No Facial Asymmetry (Cranial nerve 7 motor function) (limited exam due to video visit)          [x] No gaze palsy        [] Abnormal -          Skin:        [x] No significant exanthematous lesions or discoloration noted on facial skin         [] Abnormal -            Psychiatric:       [x] Normal Affect [] Abnormal -        [x] No Hallucinations    Other pertinent observable physical exam findings:-pt stable during video and doing well overall.         On this date 2/27/2023 I have spent 20 minutes reviewing previous notes, test results and face to face (virtual) with the patient discussing the diagnosis and importance of compliance with the treatment plan as well as documenting on the day of the visit.    Ananth Graham, was evaluated through a synchronous (real-time) audio-video encounter. The patient (or guardian if applicable) is aware that this is a billable service, which includes applicable co-pays. This Virtual Visit was conducted with patient's (and/or legal guardian's) consent. The visit was conducted pursuant to the emergency declaration under the [place] Act and the National Emergencies Act, 1135 waiver authority and the Coronavirus Preparedness and Response Supplemental Appropriations Act.  Patient identification was verified, and a caregiver was present when appropriate.   The patient was located at Home: [address] [place] [id number]  Provider was located at Facility (Appt Dept): [address] [facility], [address]  [place] [address]         --KAELA Castellanos - CNP

## 2023-03-30 DIAGNOSIS — E03.8 OTHER SPECIFIED HYPOTHYROIDISM: ICD-10-CM

## 2023-03-30 RX ORDER — LEVOTHYROXINE SODIUM 0.05 MG/1
TABLET ORAL
Qty: 30 TABLET | Refills: 2 | Status: SHIPPED | OUTPATIENT
Start: 2023-03-30

## 2023-05-20 DIAGNOSIS — F41.9 ANXIETY AND DEPRESSION: ICD-10-CM

## 2023-05-20 DIAGNOSIS — F32.A ANXIETY AND DEPRESSION: ICD-10-CM

## 2023-05-22 RX ORDER — ESCITALOPRAM OXALATE 10 MG/1
TABLET ORAL
Qty: 90 TABLET | Refills: 1 | Status: SHIPPED | OUTPATIENT
Start: 2023-05-22

## 2023-05-25 ENCOUNTER — OFFICE VISIT (OUTPATIENT)
Dept: PODIATRY | Age: 53
End: 2023-05-25
Payer: COMMERCIAL

## 2023-05-25 VITALS — WEIGHT: 135 LBS | HEIGHT: 68 IN | BODY MASS INDEX: 20.46 KG/M2

## 2023-05-25 DIAGNOSIS — S93.312A CUBOID SYNDROME OF LEFT FOOT: ICD-10-CM

## 2023-05-25 DIAGNOSIS — M21.42 PES PLANUS OF BOTH FEET: ICD-10-CM

## 2023-05-25 DIAGNOSIS — M79.605 PAIN OF LEFT LOWER EXTREMITY: Primary | ICD-10-CM

## 2023-05-25 DIAGNOSIS — M21.41 PES PLANUS OF BOTH FEET: ICD-10-CM

## 2023-05-25 PROCEDURE — 99203 OFFICE O/P NEW LOW 30 MIN: CPT | Performed by: PODIATRIST

## 2023-07-12 ENCOUNTER — HOSPITAL ENCOUNTER (OUTPATIENT)
Age: 53
Setting detail: SPECIMEN
Discharge: HOME | End: 2023-07-12

## 2023-07-12 ENCOUNTER — OFFICE VISIT (OUTPATIENT)
Dept: OBGYN CLINIC | Age: 53
End: 2023-07-12

## 2023-07-12 ENCOUNTER — OFFICE VISIT (OUTPATIENT)
Dept: FAMILY MEDICINE CLINIC | Age: 53
End: 2023-07-12
Payer: COMMERCIAL

## 2023-07-12 VITALS
WEIGHT: 150 LBS | BODY MASS INDEX: 22.81 KG/M2 | DIASTOLIC BLOOD PRESSURE: 76 MMHG | OXYGEN SATURATION: 98 % | HEART RATE: 68 BPM | RESPIRATION RATE: 16 BRPM | SYSTOLIC BLOOD PRESSURE: 116 MMHG | TEMPERATURE: 98.1 F

## 2023-07-12 VITALS
BODY MASS INDEX: 22.28 KG/M2 | HEIGHT: 68 IN | SYSTOLIC BLOOD PRESSURE: 112 MMHG | DIASTOLIC BLOOD PRESSURE: 64 MMHG | WEIGHT: 147 LBS

## 2023-07-12 DIAGNOSIS — Z12.31 SCREENING MAMMOGRAM FOR BREAST CANCER: ICD-10-CM

## 2023-07-12 DIAGNOSIS — F32.A ANXIETY AND DEPRESSION: ICD-10-CM

## 2023-07-12 DIAGNOSIS — E06.3 HASHIMOTO'S DISEASE: ICD-10-CM

## 2023-07-12 DIAGNOSIS — F51.04 PSYCHOPHYSIOLOGICAL INSOMNIA: ICD-10-CM

## 2023-07-12 DIAGNOSIS — M25.50 ARTHRALGIA, UNSPECIFIED JOINT: ICD-10-CM

## 2023-07-12 DIAGNOSIS — Z01.419 WELL WOMAN EXAM WITH ROUTINE GYNECOLOGICAL EXAM: Primary | ICD-10-CM

## 2023-07-12 DIAGNOSIS — F41.9 ANXIETY AND DEPRESSION: ICD-10-CM

## 2023-07-12 DIAGNOSIS — R23.2 HOT FLASHES: ICD-10-CM

## 2023-07-12 DIAGNOSIS — E55.9 VITAMIN D DEFICIENCY: ICD-10-CM

## 2023-07-12 DIAGNOSIS — Z00.00 PREVENTATIVE HEALTH CARE: Primary | ICD-10-CM

## 2023-07-12 DIAGNOSIS — N95.1 PERIMENOPAUSAL: ICD-10-CM

## 2023-07-12 PROBLEM — E03.8 OTHER SPECIFIED HYPOTHYROIDISM: Status: RESOLVED | Noted: 2022-07-12 | Resolved: 2023-07-12

## 2023-07-12 PROCEDURE — 99396 PREV VISIT EST AGE 40-64: CPT | Performed by: NURSE PRACTITIONER

## 2023-07-12 ASSESSMENT — ENCOUNTER SYMPTOMS
RHINORRHEA: 0
CHEST TIGHTNESS: 0
ABDOMINAL DISTENTION: 0
NAUSEA: 0
COUGH: 0
CONSTIPATION: 0
COLOR CHANGE: 0
ABDOMINAL PAIN: 0
SHORTNESS OF BREATH: 0
DIARRHEA: 0
SORE THROAT: 0
BACK PAIN: 0

## 2023-07-12 ASSESSMENT — PATIENT HEALTH QUESTIONNAIRE - PHQ9
9. THOUGHTS THAT YOU WOULD BE BETTER OFF DEAD, OR OF HURTING YOURSELF: 0
SUM OF ALL RESPONSES TO PHQ QUESTIONS 1-9: 0
4. FEELING TIRED OR HAVING LITTLE ENERGY: 0
3. TROUBLE FALLING OR STAYING ASLEEP: 0
1. LITTLE INTEREST OR PLEASURE IN DOING THINGS: 0
SUM OF ALL RESPONSES TO PHQ QUESTIONS 1-9: 0
8. MOVING OR SPEAKING SO SLOWLY THAT OTHER PEOPLE COULD HAVE NOTICED. OR THE OPPOSITE, BEING SO FIGETY OR RESTLESS THAT YOU HAVE BEEN MOVING AROUND A LOT MORE THAN USUAL: 0
SUM OF ALL RESPONSES TO PHQ9 QUESTIONS 1 & 2: 0
SUM OF ALL RESPONSES TO PHQ QUESTIONS 1-9: 0
7. TROUBLE CONCENTRATING ON THINGS, SUCH AS READING THE NEWSPAPER OR WATCHING TELEVISION: 0
5. POOR APPETITE OR OVEREATING: 0
6. FEELING BAD ABOUT YOURSELF - OR THAT YOU ARE A FAILURE OR HAVE LET YOURSELF OR YOUR FAMILY DOWN: 0
10. IF YOU CHECKED OFF ANY PROBLEMS, HOW DIFFICULT HAVE THESE PROBLEMS MADE IT FOR YOU TO DO YOUR WORK, TAKE CARE OF THINGS AT HOME, OR GET ALONG WITH OTHER PEOPLE: 0
SUM OF ALL RESPONSES TO PHQ QUESTIONS 1-9: 0
2. FEELING DOWN, DEPRESSED OR HOPELESS: 0

## 2023-07-12 NOTE — PROGRESS NOTES
Chaperone for Intimate Exam  Chaperone was offered as part of the rooming process. Patient declined and agrees to continue with exam without a chaperone.   Chaperone: n/a
show
Requested? Answer:   Yes     Order Specific Question:   High Risk Patient     Answer:   N/A     No orders of the defined types were placed in this encounter. Patient given educational materials - seepatient instructions. Discussed use, benefit, and side effects of prescribed medications. All patient questions answered. Pt voiced understanding. Reviewed health maintenance. Instructed to continue current medications, diet and exercise. Patient agreedwith treatment plan. Follow up as directed.       Electronically signed by KAELA Snyder CNP on 7/14/2023at 1:41 PM

## 2023-07-12 NOTE — PROGRESS NOTES
Devin Floyd, APRN-CNP  [address] [facility]  [address] [address] [facility] [place] [address]  Dept: [phone]  Dept Fax: [phone]    Patient ID: Jo Guzman is a 48 y.o. female Established patient. HPI    Presents for regular check-up and review of labs. Today, patient complains of  none. Pt denies any fever or chills. Pt today denies any HA, chest pain, or SOB. Pt denies any N/V/D/C or abdominal pain. - mobic does help but she is taking as needed for the pain in her ankle  - remifemin seemed to work well at the beginning but not noticing as much improvement now. She is doing better that the hot flashes are not all day they are coming and going throughout the day    Preventative care, female:  Last PAP: 3/2022- appt today   Last mammogram: 7/2022  Last colonoscopy:  [facility]  Tdap: 2007  Nicotine use: never  Alcohol use: yes socially   Drug use: never  Last dental appointment: within 6 months   Last optometry appointment: due October     - seeing OB/GYN and endo today     Previous office notes, labs, imaging and hospital records were reviewed prior to and during encounter. Otherwise pt doing well on current tx and no other concerns today. The patient's past medical, surgical, social, and family history as well as his current medications and allergies were reviewed as documented in today's encounter. Current Outpatient Medications on File Prior to Visit   Medication Sig Dispense Refill    meloxicam (MOBIC) 7.5 MG tablet take 1 tablet by mouth once daily 90 tablet 1    escitalopram (LEXAPRO) 10 MG tablet take 1 tablet by mouth once daily 90 tablet 1    levothyroxine (SYNTHROID) 50 MCG tablet take 1 tablet by mouth once daily 30 tablet 2     No current facility-administered medications on file prior to visit. Subjective:     Review of Systems   Constitutional:  Negative for activity change, fatigue and fever.    HENT:  Negative for congestion, ear

## 2023-07-13 ENCOUNTER — HOSPITAL ENCOUNTER (OUTPATIENT)
Dept: MRI IMAGING | Age: 53
Discharge: HOME | End: 2023-07-15
Attending: PODIATRIST
Payer: COMMERCIAL

## 2023-07-13 DIAGNOSIS — E03.8 OTHER SPECIFIED HYPOTHYROIDISM: ICD-10-CM

## 2023-07-13 DIAGNOSIS — M79.605 PAIN OF LEFT LOWER EXTREMITY: ICD-10-CM

## 2023-07-13 PROCEDURE — 73721 MRI JNT OF LWR EXTRE W/O DYE: CPT

## 2023-07-13 RX ORDER — LEVOTHYROXINE SODIUM 0.05 MG/1
TABLET ORAL
Qty: 30 TABLET | Refills: 2 | Status: SHIPPED | OUTPATIENT
Start: 2023-07-13

## 2023-07-14 LAB
HPV I/H RISK 4 DNA CVX QL NAA+PROBE: NOT DETECTED
HPV SAMPLE: NORMAL
HPV, INTERPRETATION: NORMAL
HPV16 DNA CVX QL NAA+PROBE: NOT DETECTED
HPV18 DNA CVX QL NAA+PROBE: NOT DETECTED
SPECIMEN DESCRIPTION: NORMAL

## 2023-07-14 ASSESSMENT — ENCOUNTER SYMPTOMS
ABDOMINAL PAIN: 0
VOMITING: 0
SHORTNESS OF BREATH: 0
COLOR CHANGE: 0
NAUSEA: 0
COUGH: 0

## 2023-07-17 ENCOUNTER — HOSPITAL ENCOUNTER (OUTPATIENT)
Dept: MAMMOGRAPHY | Age: 53
Discharge: HOME | End: 2023-07-19
Payer: COMMERCIAL

## 2023-07-17 DIAGNOSIS — Z12.31 SCREENING MAMMOGRAM FOR BREAST CANCER: ICD-10-CM

## 2023-07-17 PROCEDURE — 77063 BREAST TOMOSYNTHESIS BI: CPT

## 2023-07-22 LAB — CYTOLOGY REPORT: NORMAL

## 2023-08-03 ENCOUNTER — OFFICE VISIT (OUTPATIENT)
Dept: PODIATRY | Age: 53
End: 2023-08-03
Payer: COMMERCIAL

## 2023-08-03 VITALS — BODY MASS INDEX: 21.22 KG/M2 | HEIGHT: 68 IN | WEIGHT: 140 LBS

## 2023-08-03 DIAGNOSIS — S93.402A MODERATE ANKLE SPRAIN, LEFT, INITIAL ENCOUNTER: ICD-10-CM

## 2023-08-03 DIAGNOSIS — M21.41 PES PLANUS OF BOTH FEET: ICD-10-CM

## 2023-08-03 DIAGNOSIS — M21.42 PES PLANUS OF BOTH FEET: ICD-10-CM

## 2023-08-03 DIAGNOSIS — Q66.89 TARSAL COALITION OF LEFT FOOT: ICD-10-CM

## 2023-08-03 DIAGNOSIS — M79.605 PAIN OF LEFT LOWER EXTREMITY: Primary | ICD-10-CM

## 2023-08-03 PROCEDURE — 99214 OFFICE O/P EST MOD 30 MIN: CPT | Performed by: PODIATRIST

## 2023-08-03 RX ORDER — MAGNESIUM GLUCONATE 30 MG(550)
550 TABLET ORAL NIGHTLY
COMMUNITY

## 2023-08-03 RX ORDER — PREDNISONE 20 MG/1
TABLET ORAL
COMMUNITY
Start: 2023-07-30

## 2023-08-03 RX ORDER — TRIAMCINOLONE ACETONIDE 1 MG/G
CREAM TOPICAL
COMMUNITY
Start: 2023-07-30

## 2023-08-03 RX ORDER — LEVOTHYROXINE SODIUM 0.07 MG/1
TABLET ORAL
COMMUNITY
Start: 2023-07-12

## 2023-08-03 NOTE — PROGRESS NOTES
[address]  Dept: [phone]    RETURN PATIENT PROGRESS NOTE  Date of patient's visit: 8/3/2023  Patient's Name:  Dia Kay YOB: 1970            Patient Care Team:  KAELA Morocho CNP as PCP - General (Certified Nurse Practitioner)  KAELA Morocho CNP as PCP - Empaneled Provider  Stanford Connolly MD as Consulting Physician (Endocrinology)  KAELA Payan CNP (Nurse Practitioner)       Zuleyma Graham 48 y.o. female that presents for follow-up of   Chief Complaint   Patient presents with    Foot Pain     Left foot    Results     Discuss mri results     Pt's primary care physician is KAELA Morocho CNP last seen July 12 2023  Symptoms began 8 month(s) ago and are decreased . Patient relates pain is Present. Pain is rated 2 out of 10 and is described as intermittent. Treatments prior to today's visit include: previous podiatry treatment, mri. Currently denies F/C/N/V. Patient is here to discuss her mri results. No Known Allergies    No past medical history on file. Prior to Admission medications    Medication Sig Start Date End Date Taking? Authorizing Provider   vitamin D (CHOLECALCIFEROL) 25 MCG (1000 UT) TABS tablet Take 1 tablet by mouth 2 times daily   Yes Historical Provider, MD   levothyroxine (SYNTHROID) 75 MCG tablet take 1 capsule by mouth IN THE MORNING 7/12/23  Yes Historical Provider, MD   Magnesium Gluconate 550 MG TABS Take 550 mg by mouth nightly   Yes Historical Provider, MD   predniSONE (DELTASONE) 20 MG tablet take 2 tablets by mouth once daily IN THE MORNING for 4 days 7/30/23  Yes Historical Provider, MD   triamcinolone (KENALOG) 0.1 % cream apply A thin layer to affected area OF TORSO AND EXTREMITIES twic. ..  (REFER TO PRESCRIPTION NOTES).  7/30/23  Yes Historical Provider, MD   levothyroxine

## 2023-08-20 DIAGNOSIS — F32.A ANXIETY AND DEPRESSION: ICD-10-CM

## 2023-08-20 DIAGNOSIS — F41.9 ANXIETY AND DEPRESSION: ICD-10-CM

## 2023-08-21 RX ORDER — ESCITALOPRAM OXALATE 10 MG/1
10 TABLET ORAL DAILY
Qty: 90 TABLET | Refills: 1 | Status: SHIPPED | OUTPATIENT
Start: 2023-08-21

## 2023-08-21 NOTE — TELEPHONE ENCOUNTER
Please Approve or Refuse.   Send to Pharmacy per Pt's Request:      Next Visit Date:  Visit date not found   Last Visit Date: 7/12/2023    Hemoglobin A1C (%)   Date Value   07/19/2022 5.4             ( goal A1C is < 7)   BP Readings from Last 3 Encounters:   07/12/23 112/64   07/12/23 116/76   12/27/22 120/78          (goal 120/80)  BUN   Date Value Ref Range Status   12/10/2022 16 mg/dL Final     Creatinine   Date Value Ref Range Status   12/10/2022 0.77  Final     Potassium   Date Value Ref Range Status   12/10/2022 4.3 mmol/L Final

## 2023-08-22 ENCOUNTER — OFFICE VISIT (OUTPATIENT)
Dept: OBGYN CLINIC | Age: 53
End: 2023-08-22
Payer: COMMERCIAL

## 2023-08-22 ENCOUNTER — HOSPITAL ENCOUNTER (OUTPATIENT)
Age: 53
Setting detail: SPECIMEN
Discharge: HOME | End: 2023-08-22

## 2023-08-22 VITALS
DIASTOLIC BLOOD PRESSURE: 60 MMHG | BODY MASS INDEX: 22.28 KG/M2 | WEIGHT: 147 LBS | SYSTOLIC BLOOD PRESSURE: 110 MMHG | HEIGHT: 68 IN

## 2023-08-22 DIAGNOSIS — R23.2 HOT FLASHES: ICD-10-CM

## 2023-08-22 DIAGNOSIS — Z78.0 POSTMENOPAUSAL: ICD-10-CM

## 2023-08-22 DIAGNOSIS — E03.9 HYPOTHYROIDISM, UNSPECIFIED TYPE: ICD-10-CM

## 2023-08-22 DIAGNOSIS — R53.83 FATIGUE, UNSPECIFIED TYPE: ICD-10-CM

## 2023-08-22 DIAGNOSIS — G47.00 INSOMNIA, UNSPECIFIED TYPE: ICD-10-CM

## 2023-08-22 DIAGNOSIS — R23.2 HOT FLASHES: Primary | ICD-10-CM

## 2023-08-22 DIAGNOSIS — R63.5 WEIGHT GAIN: ICD-10-CM

## 2023-08-22 LAB
ALBUMIN SERPL-MCNC: 4.5 G/DL (ref 3.5–5.2)
ALBUMIN/GLOB SERPL: 1.5 {RATIO} (ref 1–2.5)
ALP SERPL-CCNC: 81 U/L (ref 35–104)
ALT SERPL-CCNC: 11 U/L (ref 5–33)
ANION GAP SERPL CALCULATED.3IONS-SCNC: 13 MMOL/L (ref 9–17)
AST SERPL-CCNC: 16 U/L
BILIRUB SERPL-MCNC: 0.4 MG/DL (ref 0.3–1.2)
BUN SERPL-MCNC: 26 MG/DL (ref 6–20)
CALCIUM SERPL-MCNC: 9.7 MG/DL (ref 8.6–10.4)
CHLORIDE SERPL-SCNC: 98 MMOL/L (ref 98–107)
CO2 SERPL-SCNC: 26 MMOL/L (ref 20–31)
CREAT SERPL-MCNC: 0.9 MG/DL (ref 0.5–0.9)
GFR SERPL CREATININE-BSD FRML MDRD: >60 ML/MIN/1.73M2
GLUCOSE SERPL-MCNC: 93 MG/DL (ref 70–99)
POTASSIUM SERPL-SCNC: 4.5 MMOL/L (ref 3.7–5.3)
PROT SERPL-MCNC: 7.5 G/DL (ref 6.4–8.3)
SODIUM SERPL-SCNC: 137 MMOL/L (ref 135–144)

## 2023-08-22 PROCEDURE — 99214 OFFICE O/P EST MOD 30 MIN: CPT | Performed by: NURSE PRACTITIONER

## 2023-08-22 RX ORDER — FEZOLINETANT 45 MG/1
1 TABLET, FILM COATED ORAL DAILY
Qty: 30 TABLET | Refills: 2 | Status: SHIPPED | OUTPATIENT
Start: 2023-08-22

## 2023-08-22 NOTE — PROGRESS NOTES
Subjective:   Hormone Replacement Counseling: Patient presents to discuss hormone replacement therapy. Patient is requesting hormone replacement therapy due to hot flashes, insomnia, and no energy. The patient is not taking hormone replacement therapy. Patient denies post-menopausal vaginal bleeding. The patient is sexually active. last pap: approximate date July 2023 and was normal. Patient is hormone deficient due to menopause, which occurred at age 54, LMP May 2022. The patient currently has symptoms of hot flashes, insomnia, no energy, weight gain. Symptoms in past had included hot flashes. She was here for annual exam PAP on 7/12/23 with similar complaints   significant hot flashes she has insomnia also but not sure if worse with hot flashes because has hx of insomnia but hot flashes do wake her up. She states hot flashes can come in waves can have sometimes 15 in a day where she is sweating at work and impacts her ADLS. She also admits to weight gain and works out regularly and eats healthy, she does admit to some discomfort initially with intercourse but then improves denies significant pain or dryness. No PMB or PCB. She has tried OTC supplements seem to help for short time then return. She also is on lexapro and states that helped for bit but then they came back. She has hx hypothyroidism on synthroid states last TSH was 9.83 on 7/12/23 but she has had medication adjusted she states thinks maybe some symptoms related to thyroid but even when managed will still get hot flashes. She denies personal history of breast cancer, coronary heart disease [CHD], a previous venous thromboembolic event or stroke, or active liver disease. Review of systems per HPI, otherwise negative. Allergies; medications; past medical, surgical, family, and social history; and problem list reviewed as indicated in this encounter.     Objective:  Vitals:  Blood pressure 110/60, height 5' 8\" (1.727 m), weight 147 lb (66.7

## 2023-08-27 DIAGNOSIS — Z79.899 MEDICATION MANAGEMENT: Primary | ICD-10-CM

## 2023-08-27 DIAGNOSIS — R23.2 HOT FLASHES: ICD-10-CM

## 2023-08-27 DIAGNOSIS — Z78.0 POSTMENOPAUSAL: ICD-10-CM

## 2023-09-01 ENCOUNTER — TELEPHONE (OUTPATIENT)
Dept: FAMILY MEDICINE CLINIC | Age: 53
End: 2023-09-01

## 2023-09-13 NOTE — TELEPHONE ENCOUNTER
[facility] colorectal surgeon    Colonoscopy questionnaire     Pt Name: Artemio Christianson   MRN: [id number]   YOB: 1970   Primary Care Physician: KAELA Gleason CNP         Have you ever had a colonoscopy? yes  When was your last colonoscopy? 2013  Were any polyps removed or any other significant findings? No   2. Have you recently had a stool test to check for colon cancer? No  Was it positive? N/A     3. Do you have any family history of colon cancer? No  If yes, which family member had colon cancer? N/A  Were they diagnosed younger or older than the age of 61? N/A  4. Do you have a history of Crohn's disease or Ulcerative Colitis? No     5. Do you have a history of constipation? No     6. Do you have a history of bloody or black stools? No     Have you ever had surgery done inside your abdomen? Yes  What surgery? Appendectomy     8. Are you taking any blood thinners? No              If yes, what is the name of the blood thinner? N/A    [facility]     Diagnosis for colonoscopy: Screening    Scheduling:     Previous History      No past medical history on file.       Past Surgical History:   Procedure Laterality Date    APPENDECTOMY          Family History   Problem Relation Age of Onset    Heart Disease Maternal Grandmother     Diabetes Paternal Grandfather         No Known Allergies     Current Outpatient Medications   Medication Sig Dispense Refill    fezolinetant (VEOZAH) 45 MG TABS Take 1 tablet by mouth daily 30 tablet 2    escitalopram (LEXAPRO) 10 MG tablet Take 1 tablet by mouth daily 90 tablet 1    vitamin D (CHOLECALCIFEROL) 25 MCG (1000 UT) TABS tablet Take 1 tablet by mouth 2 times daily      levothyroxine (SYNTHROID) 75 MCG tablet take 1 capsule by mouth IN THE MORNING      Magnesium Gluconate 550 MG TABS Take 550 mg by mouth nightly      predniSONE (DELTASONE) 20 MG tablet take 2 tablets by mouth once daily IN THE MORNING for 4 days

## 2023-10-08 ENCOUNTER — APPOINTMENT (OUTPATIENT)
Dept: GENERAL RADIOLOGY | Age: 53
End: 2023-10-08
Payer: COMMERCIAL

## 2023-10-08 ENCOUNTER — HOSPITAL ENCOUNTER (EMERGENCY)
Age: 53
Discharge: HOME | End: 2023-10-08
Attending: EMERGENCY MEDICINE
Payer: COMMERCIAL

## 2023-10-08 VITALS
WEIGHT: 141.09 LBS | HEART RATE: 66 BPM | TEMPERATURE: 97.5 F | SYSTOLIC BLOOD PRESSURE: 118 MMHG | RESPIRATION RATE: 18 BRPM | BODY MASS INDEX: 20.9 KG/M2 | DIASTOLIC BLOOD PRESSURE: 74 MMHG | HEIGHT: 69 IN | OXYGEN SATURATION: 98 %

## 2023-10-08 DIAGNOSIS — S93.401A SPRAIN OF RIGHT ANKLE, UNSPECIFIED LIGAMENT, INITIAL ENCOUNTER: Primary | ICD-10-CM

## 2023-10-08 PROCEDURE — 73630 X-RAY EXAM OF FOOT: CPT

## 2023-10-08 PROCEDURE — 73610 X-RAY EXAM OF ANKLE: CPT

## 2023-10-08 PROCEDURE — 99283 EMERGENCY DEPT VISIT LOW MDM: CPT

## 2023-10-08 ASSESSMENT — PAIN DESCRIPTION - ORIENTATION: ORIENTATION: RIGHT

## 2023-10-08 ASSESSMENT — ENCOUNTER SYMPTOMS
SHORTNESS OF BREATH: 0
ABDOMINAL PAIN: 0
DIARRHEA: 0
VOMITING: 0
BACK PAIN: 0
NAUSEA: 0
COUGH: 0

## 2023-10-08 ASSESSMENT — PAIN SCALES - GENERAL: PAINLEVEL_OUTOF10: 9

## 2023-10-08 ASSESSMENT — PAIN DESCRIPTION - PAIN TYPE: TYPE: ACUTE PAIN

## 2023-10-08 ASSESSMENT — PAIN DESCRIPTION - LOCATION: LOCATION: ANKLE

## 2023-10-08 ASSESSMENT — PAIN DESCRIPTION - DESCRIPTORS: DESCRIPTORS: ACHING

## 2023-10-08 ASSESSMENT — PAIN - FUNCTIONAL ASSESSMENT: PAIN_FUNCTIONAL_ASSESSMENT: 0-10

## 2023-10-08 NOTE — ED PROVIDER NOTES
[address] [facility] Emergency Department  [address] [facility] [address]  Phone: [phone]  Fax: [phone]      Attending Physician Attestation    I performed a history and physical examination of the patient and discussed management with the mid level provider. I reviewed the mid level provider's note and agree with the documented findings and plan of care. Any areas of disagreement are noted on the chart. I was personally present for the key portions of any procedures. I have documented in the chart those procedures where I was not present during the key portions. I have reviewed the emergency nurses triage note. I agree with the chief complaint, past medical history, past surgical history, allergies, medications, social and family history as documented unless otherwise noted below. Documentation of the HPI, Physical Exam and Medical Decision Making performed by mid level providers is based on my personal performance of the HPI, PE and MDM. For Physician Assistant/ Nurse Practitioner cases/documentation I have personally evaluated this patient and have completed at least one if not all key elements of the E/M (history, physical exam, and MDM). Additional findings are as noted. CHIEF COMPLAINT       Chief Complaint   Patient presents with    Ankle Pain     Right ankle pain after awkward rotation. Pt describes \"rolling\" her ankle today. Pain with weight bearing. HISTORY OF PRESENT ILLNESS    Dia Kay is a 48 y.o. female who presents to the emergency department today complaining of medial right ankle pain she. She is sustained an eversion injury to this ankle. Pain on a scale of 0-10 is a 9. Ambulation makes pain worse. Nothing is pain better. PAST MEDICAL HISTORY    has no past medical history on file. SURGICAL HISTORY      has a past surgical history that includes Appendectomy.     CURRENT MEDICATIONS       Previous Medications    ESCITALOPRAM

## 2023-10-08 NOTE — DISCHARGE INSTRUCTIONS
PLEASE RETURN TO THE EMERGENCY DEPARTMENT IMMEDIATELY if your symptoms worsen in anyway or in 1-2 days if not improved for re-evaluation. You should immediately return to the ER for symptoms such as new or worsening pain, fever, numbness or weakness to the arms or legs, coolness or color change of the arms or legs. Take your medication as indicated and prescribed. If you are given an antibiotic then, make sure you get the prescription filled and take the antibiotics until finished. Please understand that at this time there is no evidence for a more serious underlying process, but that early in the process of an illness or injury, an emergency department workup can be falsely reassuring. You should contact your family doctor within the next 48 hours for a follow up appointment as X-rays may not show an occult fracture for several days    [address]!!!    From [facility] and [address]    On behalf of the Emergency Department staff at [facility]), I would like to thank you for giving us the opportunity to address your health care needs and concerns. We hope that during your visit, our service was delivered in a professional and caring manner. Please keep [facility] in mind as we walk with you down the path to your own personal wellness. Please expect an automated text message or email from us so we can ask a few questions about your health and progress. Based on your answers, a clinician may call you back to offer help and instructions. Please understand that early in the process of an illness or injury, an emergency department workup can be falsely reassuring. If you notice any worsening, changing or persistent symptoms please call your family doctor or return to the ER immediately. Tell us how we did during your visit at [email or web address]   and let us know about your experience

## 2023-10-08 NOTE — ED PROVIDER NOTES
[address] [facility] Emergency Department  [address] [address] [facility] [address]  Phone: [phone]  Fax: [phone]        Pt Name: Neil Gallardo  MRN: [id number]  [address] 1970  Date of evaluation: 10/8/23    [address]       Chief Complaint   Patient presents with    Ankle Pain     Right ankle pain after awkward rotation. Pt describes \"rolling\" her ankle today. Pain with weight bearing. HISTORY OF PRESENT ILLNESS (Location/Symptom, Timing/Onset, Context/Setting, Quality, Duration, Modifying Factors, Severity)      Neil Gallardo is a 48 y.o. female with no pertinent PMH who presents to the ED via private auto with right ankle pain. Patient states earlier this afternoon she stepped backwards off her patio and rolled her ankle. She denies falling, hitting her head or loss conscious. She has not taken medication for symptoms. States that walking does make her symptoms worse. She denies any prior injury or surgeries to the right foot or ankle. On arrival she is rest on the cot comfortably with even unlabored breaths nontoxic-appearing no acute distress noted. PAST MEDICAL / SURGICAL / SOCIAL / FAMILY HISTORY     PMH:  has no past medical history on file. Surgical History:  has a past surgical history that includes Appendectomy. Social History:  reports that she has never smoked. She has never used smokeless tobacco. She reports current alcohol use. She reports that she does not use drugs. Family History: She indicated that the status of her maternal grandmother is unknown. She indicated that the status of her paternal grandfather is unknown.   family history includes Diabetes in her paternal grandfather; Heart Disease in her maternal grandmother. Psychiatric History: None    Allergies: Patient has no known allergies. Home Medications:   Prior to Admission medications    Medication Sig Start Date End Date Taking?  Authorizing Provider   fezolinetant [facility]) 39

## 2023-10-11 DIAGNOSIS — E03.8 OTHER SPECIFIED HYPOTHYROIDISM: ICD-10-CM

## 2023-10-11 RX ORDER — LEVOTHYROXINE SODIUM 0.05 MG/1
TABLET ORAL
Qty: 90 TABLET | Refills: 1 | OUTPATIENT
Start: 2023-10-11

## 2023-10-16 ENCOUNTER — TELEPHONE (OUTPATIENT)
Dept: SURGERY | Age: 53
End: 2023-10-16

## 2023-10-16 RX ORDER — SOD SULF/POT CHLORIDE/MAG SULF 1.479 G
TABLET ORAL
Qty: 24 TABLET | Refills: 0 | Status: ON HOLD | OUTPATIENT
Start: 2023-10-16 | End: 2023-10-18 | Stop reason: HOSPADM

## 2023-10-16 NOTE — TELEPHONE ENCOUNTER
Dr Charles Pimentel Colonoscopy    10-18-23 1:30PM/ 12:00 arrival  [facility]    Reviewed Bowel Prep and Pre Op instructions/Attached in [address] [place] [address]. Ligia Cain LPN signed SuTab which I verified is partially covered by insurance at pharmacy. Pt given updated start/arrival time.

## 2023-10-17 ENCOUNTER — ANESTHESIA EVENT (OUTPATIENT)
Dept: OPERATING ROOM | Age: 53
End: 2023-10-17
Payer: COMMERCIAL

## 2023-10-18 ENCOUNTER — ANESTHESIA (OUTPATIENT)
Dept: OPERATING ROOM | Age: 53
End: 2023-10-18
Payer: COMMERCIAL

## 2023-10-18 ENCOUNTER — HOSPITAL ENCOUNTER (OUTPATIENT)
Age: 53
Setting detail: OUTPATIENT SURGERY
Discharge: HOME | End: 2023-10-18
Attending: COLON & RECTAL SURGERY | Admitting: COLON & RECTAL SURGERY
Payer: COMMERCIAL

## 2023-10-18 VITALS
OXYGEN SATURATION: 98 % | HEART RATE: 66 BPM | RESPIRATION RATE: 16 BRPM | DIASTOLIC BLOOD PRESSURE: 63 MMHG | WEIGHT: 145.2 LBS | SYSTOLIC BLOOD PRESSURE: 101 MMHG | TEMPERATURE: 97.5 F | BODY MASS INDEX: 22.01 KG/M2 | HEIGHT: 68 IN

## 2023-10-18 PROCEDURE — 7100000010 HC PHASE II RECOVERY - FIRST 15 MIN: Performed by: COLON & RECTAL SURGERY

## 2023-10-18 PROCEDURE — 3609027000 HC COLONOSCOPY: Performed by: COLON & RECTAL SURGERY

## 2023-10-18 PROCEDURE — 3700000001 HC ADD 15 MINUTES (ANESTHESIA): Performed by: COLON & RECTAL SURGERY

## 2023-10-18 PROCEDURE — 2500000003 HC RX 250 WO HCPCS: Performed by: NURSE ANESTHETIST, CERTIFIED REGISTERED

## 2023-10-18 PROCEDURE — 6360000002 HC RX W HCPCS: Performed by: NURSE ANESTHETIST, CERTIFIED REGISTERED

## 2023-10-18 PROCEDURE — 2709999900 HC NON-CHARGEABLE SUPPLY: Performed by: COLON & RECTAL SURGERY

## 2023-10-18 PROCEDURE — 7100000011 HC PHASE II RECOVERY - ADDTL 15 MIN: Performed by: COLON & RECTAL SURGERY

## 2023-10-18 PROCEDURE — 45378 DIAGNOSTIC COLONOSCOPY: CPT | Performed by: COLON & RECTAL SURGERY

## 2023-10-18 PROCEDURE — 2580000003 HC RX 258: Performed by: ANESTHESIOLOGY

## 2023-10-18 PROCEDURE — 3700000000 HC ANESTHESIA ATTENDED CARE: Performed by: COLON & RECTAL SURGERY

## 2023-10-18 RX ORDER — HYDRALAZINE HYDROCHLORIDE 20 MG/ML
10 INJECTION INTRAMUSCULAR; INTRAVENOUS
Status: DISCONTINUED | OUTPATIENT
Start: 2023-10-18 | End: 2023-10-18 | Stop reason: HOSPADM

## 2023-10-18 RX ORDER — SODIUM CHLORIDE 9 MG/ML
INJECTION, SOLUTION INTRAVENOUS PRN
Status: DISCONTINUED | OUTPATIENT
Start: 2023-10-18 | End: 2023-10-18 | Stop reason: HOSPADM

## 2023-10-18 RX ORDER — PROCHLORPERAZINE EDISYLATE 5 MG/ML
5 INJECTION INTRAMUSCULAR; INTRAVENOUS
Status: DISCONTINUED | OUTPATIENT
Start: 2023-10-18 | End: 2023-10-18 | Stop reason: HOSPADM

## 2023-10-18 RX ORDER — SODIUM CHLORIDE 0.9 % (FLUSH) 0.9 %
5-40 SYRINGE (ML) INJECTION PRN
Status: DISCONTINUED | OUTPATIENT
Start: 2023-10-18 | End: 2023-10-18 | Stop reason: HOSPADM

## 2023-10-18 RX ORDER — LEVOTHYROXINE SODIUM 88 UG/1
88 TABLET ORAL DAILY
Status: ON HOLD | COMMUNITY
End: 2023-10-18 | Stop reason: HOSPADM

## 2023-10-18 RX ORDER — SODIUM CHLORIDE, SODIUM LACTATE, POTASSIUM CHLORIDE, CALCIUM CHLORIDE 600; 310; 30; 20 MG/100ML; MG/100ML; MG/100ML; MG/100ML
INJECTION, SOLUTION INTRAVENOUS CONTINUOUS
Status: DISCONTINUED | OUTPATIENT
Start: 2023-10-18 | End: 2023-10-18 | Stop reason: HOSPADM

## 2023-10-18 RX ORDER — PROPOFOL 10 MG/ML
INJECTION, EMULSION INTRAVENOUS CONTINUOUS PRN
Status: DISCONTINUED | OUTPATIENT
Start: 2023-10-18 | End: 2023-10-18 | Stop reason: SDUPTHER

## 2023-10-18 RX ORDER — SODIUM CHLORIDE 0.9 % (FLUSH) 0.9 %
5-40 SYRINGE (ML) INJECTION EVERY 12 HOURS SCHEDULED
Status: DISCONTINUED | OUTPATIENT
Start: 2023-10-18 | End: 2023-10-18 | Stop reason: HOSPADM

## 2023-10-18 RX ORDER — LABETALOL HYDROCHLORIDE 5 MG/ML
10 INJECTION, SOLUTION INTRAVENOUS
Status: DISCONTINUED | OUTPATIENT
Start: 2023-10-18 | End: 2023-10-18 | Stop reason: HOSPADM

## 2023-10-18 RX ORDER — LIDOCAINE HYDROCHLORIDE 10 MG/ML
INJECTION, SOLUTION INFILTRATION; PERINEURAL PRN
Status: DISCONTINUED | OUTPATIENT
Start: 2023-10-18 | End: 2023-10-18 | Stop reason: SDUPTHER

## 2023-10-18 RX ORDER — LIDOCAINE HYDROCHLORIDE 10 MG/ML
1 INJECTION, SOLUTION EPIDURAL; INFILTRATION; INTRACAUDAL; PERINEURAL
Status: DISCONTINUED | OUTPATIENT
Start: 2023-10-18 | End: 2023-10-18 | Stop reason: HOSPADM

## 2023-10-18 RX ORDER — PROPOFOL 10 MG/ML
INJECTION, EMULSION INTRAVENOUS PRN
Status: DISCONTINUED | OUTPATIENT
Start: 2023-10-18 | End: 2023-10-18 | Stop reason: SDUPTHER

## 2023-10-18 RX ADMIN — LIDOCAINE HYDROCHLORIDE 50 MG: 10 INJECTION, SOLUTION INFILTRATION; PERINEURAL at 14:11

## 2023-10-18 RX ADMIN — PROPOFOL 100 MG: 10 INJECTION, EMULSION INTRAVENOUS at 14:11

## 2023-10-18 RX ADMIN — PROPOFOL 150 MCG/KG/MIN: 10 INJECTION, EMULSION INTRAVENOUS at 14:11

## 2023-10-18 RX ADMIN — SODIUM CHLORIDE, POTASSIUM CHLORIDE, SODIUM LACTATE AND CALCIUM CHLORIDE: 600; 310; 30; 20 INJECTION, SOLUTION INTRAVENOUS at 12:40

## 2023-10-18 ASSESSMENT — PAIN - FUNCTIONAL ASSESSMENT: PAIN_FUNCTIONAL_ASSESSMENT: NONE - DENIES PAIN

## 2023-10-18 NOTE — INTERVAL H&P NOTE
Update History & Physical    The patient's History and Physical of October 2, 2023 was reviewed with the patient and I examined the patient. There was no change. The surgical site was confirmed by the patient and me. Plan: The risks, benefits, expected outcome, and alternative to the recommended procedure have been discussed with the patient. Patient understands and wants to proceed with the procedure.      Electronically signed by Lee Vazquez MD on 10/18/2023 at 2:07 PM

## 2023-10-18 NOTE — OP NOTE
Colonoscopy Operative Report    Pre-operative Diagnosis: Screening    Post-operative Diagnosis: Normal colon. Procedure: Total diagnostic colonoscopy    Surgeon: AYO Lovelace  Assistant: RN/ Technician    IV Medications: Monitored anesthesia administered by anesthesiologist    Complications: None    Estimated blood loss: None    Bowel Prep Quality:    []  Good    []  Some fecal material but acceptable    []  Unacceptable      Findings:      [x]  Normal Colon    []  Polyps      []  Diverticulosis noted at     []  Hemorrhoids:     [] Mass/ Tumor    [] Other:    Specimens:   * No specimens in log *    Implants:  * No implants in log *      Drains:  * No LDAs found *    Details: The patient was placed in left lateral position on the operating table. After the initiation of intravenous sedatives by the anesthesiologist, digital rectal exam was done, and a flexible pediatric colonoscope of variable stiffness was inserted per rectum and advanced all the way to cecum under direct vision. The cecum was recognized by the ileocecal valve. The colonoscope was withdrawn, and the ascending, transverse, descending, and sigmoid segments of the colon as well as the rectum were carefully examined. The tip of the colonoscope was retroflexed in the rectum to inspect the anorectal segment. Findings listed above were noted, and actions listed above undertaken,    Clinical Impression: Normal colon. Clinical Recommendation: Follow-up colonoscopy in 10 years for screening unless otherwise indicated before.     Electronically signed by Lauryn Hernandez MD on 10/18/2023 at 2:29 PM

## 2023-10-18 NOTE — ANESTHESIA POSTPROCEDURE EVALUATION
Department of Anesthesiology  Postprocedure Note    Patient: Adela Fink  MRN: [id number]  YOB: 1970  Date of evaluation: 10/18/2023      Procedure Summary     Date: 10/18/23 Room / Location: [address] / [facility]) [facility]    Anesthesia Start: 4038 Anesthesia Stop: 1430    Procedure: COLORECTAL CANCER SCREENING, NOT HIGH RISK Diagnosis:       Screen for colon cancer      (Screen for colon cancer [Z12.11])    Surgeons: Jose Cronin MD Responsible Provider: Jose Crenshaw MD    Anesthesia Type: MAC ASA Status: 2          Anesthesia Type: No value filed.     Marla Phase I: Marla Score: 10    Marla Phase II: Marla Score: 9      Anesthesia Post Evaluation    Patient location during evaluation: PACU  Patient participation: complete - patient participated  Level of consciousness: awake and alert  Airway patency: patent  Nausea & Vomiting: no nausea and no vomiting  Complications: no  Cardiovascular status: blood pressure returned to baseline  Respiratory status: acceptable and room air  Hydration status: euvolemic  Pain management: adequate and satisfactory to patient

## 2023-10-18 NOTE — ANESTHESIA PRE PROCEDURE
Department of Anesthesiology  Preprocedure Note       Name:  Kim Olsen   Age:  48 y.o.  :  1970                                          MRN:  [id number]         Date:  10/18/2023      Surgeon: Santiago Butler):  Nelson Mesa MD    Procedure: Procedure(s):  COLORECTAL CANCER SCREENING, NOT HIGH RISK    Medications prior to admission:   Prior to Admission medications    Medication Sig Start Date End Date Taking? Authorizing Provider   levothyroxine (SYNTHROID) 88 MCG tablet Take 1 tablet by mouth Daily   Yes Batsheva Cagle MD   Sodium Sulfate-Mag Sulfate-KCl (SUTAB) 4278-227-539 MG TABS Follow bowel prep instructions 10/16/23   Nelson Mesa MD   [facility]) 45 MG TABS Take 1 tablet by mouth daily 23   KAELA Ga CNP   escitalopram (LEXAPRO) 10 MG tablet Take 1 tablet by mouth daily 23   KAELA Tadeo CNP   vitamin D (CHOLECALCIFEROL) 25 MCG (1000 UT) TABS tablet Take 1 tablet by mouth 2 times daily  Patient not taking: Reported on 10/18/2023    Batsheva Cagle MD   levothyroxine (SYNTHROID) 75 MCG tablet 88 mcg  Patient not taking: Reported on 10/18/2023 7/12/23   Batsheva Cagle MD   Magnesium Gluconate 550 MG TABS Take 550 mg by mouth nightly    Batsheva Cagle MD   predniSONE (DELTASONE) 20 MG tablet take 2 tablets by mouth once daily IN THE MORNING for 4 days  Patient not taking: Reported on 10/8/2023 7/30/23   Batsheva Cagle MD   triamcinolone (KENALOG) 0.1 % cream apply A thin layer to affected area OF TORSO AND EXTREMITIES twic. ..  (REFER TO PRESCRIPTION NOTES).   Patient not taking: Reported on 10/8/2023 7/30/23   Batsheva Cagle MD   levothyroxine (SYNTHROID) 50 MCG tablet take 1 tablet by mouth once daily 23   KAELA Tadeo CNP   meloxicam GEGE OSHEA [facility]) 7.5 MG tablet take 1 tablet by mouth once daily 23   KAELA Tadeo CNP       Current medications:    Current Facility-Administered

## 2023-11-22 DIAGNOSIS — F41.9 ANXIETY AND DEPRESSION: ICD-10-CM

## 2023-11-22 DIAGNOSIS — F32.A ANXIETY AND DEPRESSION: ICD-10-CM

## 2023-11-22 RX ORDER — ESCITALOPRAM OXALATE 10 MG/1
10 TABLET ORAL DAILY
Qty: 90 TABLET | Refills: 1 | Status: SHIPPED | OUTPATIENT
Start: 2023-11-22

## 2023-11-27 ENCOUNTER — OFFICE VISIT (OUTPATIENT)
Dept: OBGYN CLINIC | Age: 53
End: 2023-11-27
Payer: COMMERCIAL

## 2023-11-27 VITALS — WEIGHT: 149 LBS | BODY MASS INDEX: 22.66 KG/M2 | DIASTOLIC BLOOD PRESSURE: 64 MMHG | SYSTOLIC BLOOD PRESSURE: 118 MMHG

## 2023-11-27 DIAGNOSIS — E03.9 HYPOTHYROIDISM, UNSPECIFIED TYPE: ICD-10-CM

## 2023-11-27 DIAGNOSIS — R23.2 HOT FLASHES: Primary | ICD-10-CM

## 2023-11-27 DIAGNOSIS — Z51.81 MEDICATION MONITORING ENCOUNTER: ICD-10-CM

## 2023-11-27 DIAGNOSIS — Z78.0 POSTMENOPAUSAL: ICD-10-CM

## 2023-11-27 PROCEDURE — 99214 OFFICE O/P EST MOD 30 MIN: CPT | Performed by: NURSE PRACTITIONER

## 2023-11-27 NOTE — PROGRESS NOTES
Subjective: Ruddy Patel is in for follow up postmenopausal symptoms, hot  flashes. Ruddy Patel was started on Veozah 3 months ago. Patient is hormone deficient due to menopause, which occurred at age 54, LMP May 2022. The patient currently has symptoms of hot flashes, insomnia, no energy, weight gain. She stated when seen in August 2023 has hx of insomnia but hot flashes do wake her up. She stated hot flashes can come in waves can have sometimes 15 in a day where she would be sweating at work and impact her ADLS. She also admits to weight gain and works out regularly and eats healthy, she does admit to some discomfort initially with intercourse but then improves denies significant pain or dryness. No PMB or PCB. She had previously tried OTC supplements seem to help for short time then return. She also is on lexapro and states that helped for bit but then they came back. She has hx hypothyroidism on synthroid  last TSH was 2.3 in OCtober 2023, it was 9.83 on 7/12/23 had synthroid adjusted   She denies personal history of breast cancer, coronary heart disease [CHD], a previous venous thromboembolic event or stroke, or active liver disease or renal failure. She had normal liver panel in August and was started on veozah this has helped her hot flashes she states still gets intermittently but feels have decreased in number and intensity. She  denies noting any intolerance or adverse effects. Denies abdominal or back pain, nausea, vomiting, diarrhea, increased insomnia or yellowing of skin or eyes. Review of systems per HPI, otherwise negative. Allergies; medications; past medical, surgical, family, and social history; and problem list reviewed as indicated in this encounter. Objective:  Vitals:  Blood pressure 118/64, weight 67.6 kg (149 lb), not currently breastfeeding. Constitutional: She is oriented to person, place, and time. She appears well-developed and well-nourished and in no acute distress.

## 2023-11-29 ENCOUNTER — HOSPITAL ENCOUNTER (OUTPATIENT)
Age: 53
Discharge: HOME | End: 2023-11-29
Payer: COMMERCIAL

## 2023-11-29 DIAGNOSIS — Z78.0 POSTMENOPAUSAL: ICD-10-CM

## 2023-11-29 DIAGNOSIS — Z51.81 MEDICATION MONITORING ENCOUNTER: ICD-10-CM

## 2023-11-29 DIAGNOSIS — R23.2 HOT FLASHES: ICD-10-CM

## 2023-11-29 LAB
ALBUMIN SERPL-MCNC: 4.6 G/DL (ref 3.5–5.2)
ALBUMIN/GLOB SERPL: 1.5 {RATIO} (ref 1–2.5)
ALP SERPL-CCNC: 78 U/L (ref 35–104)
ALT SERPL-CCNC: 13 U/L (ref 5–33)
AST SERPL-CCNC: 19 U/L
BILIRUB DIRECT SERPL-MCNC: <0.1 MG/DL
BILIRUB INDIRECT SERPL-MCNC: NORMAL MG/DL (ref 0–1)
BILIRUB SERPL-MCNC: 0.3 MG/DL (ref 0.3–1.2)
PROT SERPL-MCNC: 7.6 G/DL (ref 6.4–8.3)

## 2023-11-29 PROCEDURE — 80076 HEPATIC FUNCTION PANEL: CPT

## 2023-11-29 PROCEDURE — 36415 COLL VENOUS BLD VENIPUNCTURE: CPT

## 2023-11-30 RX ORDER — FEZOLINETANT 45 MG/1
1 TABLET, FILM COATED ORAL DAILY
Qty: 30 TABLET | Refills: 2 | Status: SHIPPED | OUTPATIENT
Start: 2023-11-30

## 2024-01-11 ENCOUNTER — PATIENT MESSAGE (OUTPATIENT)
Dept: FAMILY MEDICINE CLINIC | Age: 54
End: 2024-01-11

## 2024-01-11 ENCOUNTER — TELEMEDICINE (OUTPATIENT)
Dept: FAMILY MEDICINE CLINIC | Age: 54
End: 2024-01-11
Payer: COMMERCIAL

## 2024-01-11 DIAGNOSIS — H81.10 BENIGN PAROXYSMAL POSITIONAL VERTIGO, UNSPECIFIED LATERALITY: Primary | ICD-10-CM

## 2024-01-11 PROCEDURE — 99213 OFFICE O/P EST LOW 20 MIN: CPT | Performed by: FAMILY MEDICINE

## 2024-01-11 RX ORDER — MECLIZINE HYDROCHLORIDE 25 MG/1
25 TABLET ORAL 3 TIMES DAILY PRN
Qty: 20 TABLET | Refills: 0 | Status: SHIPPED | OUTPATIENT
Start: 2024-01-11 | End: 2024-01-21

## 2024-01-11 RX ORDER — LEVOTHYROXINE SODIUM 88 UG/1
88 TABLET ORAL EVERY MORNING
COMMUNITY
Start: 2023-12-26

## 2024-01-11 ASSESSMENT — ENCOUNTER SYMPTOMS
ABDOMINAL PAIN: 0
VOMITING: 0
CONSTIPATION: 0
SINUS PAIN: 0
NAUSEA: 0
ABDOMINAL DISTENTION: 0
CHEST TIGHTNESS: 0
SORE THROAT: 0
SHORTNESS OF BREATH: 0
RHINORRHEA: 0
BACK PAIN: 0
COUGH: 0
DIARRHEA: 0

## 2024-01-11 NOTE — PROGRESS NOTES
Mera Lama MD  [facility]  [facility] MEDICINE  [address], [address]  [facility] [place] [facility] [address]  Dept: [phone]  Dept Fax: [phone]     Patient ID: Ananth Graham is a 53 y.o. female.    HPI    Established patient presents via virtual visit for an acute visit secondary to an episode of vertigo that started after she was doing yoga two days ago. She states she fell to the floor and then vomited and just felt \"off\" the rest of the day. She didn't do anything different than she has done in the past. Yesterday she felt fine, but today she does fell a little dizzy and doesn't feel right.  Pt denies any fever or chills.  Pt today denies any HA, chest pain, or SOB.  Pt denies any N/V/D/C or abdominal pain.    Otherwise pt doing well on current tx and no other concerns today.     The patient's past medical, surgical, social, and family history as well as his current medications and allergies were reviewed as documented in today's encounter.      My previous office notes, consult notes, labs and diagnostic studies were reviewed prior to and during encounter.    Current Outpatient Medications on File Prior to Visit   Medication Sig Dispense Refill    VEOZAH 45 MG TABS take 1 tablet by mouth once daily 30 tablet 2    escitalopram (LEXAPRO) 10 MG tablet Take 1 tablet by mouth daily 90 tablet 1    Magnesium Gluconate 550 MG TABS Take 550 mg by mouth nightly      levothyroxine (SYNTHROID) 50 MCG tablet take 1 tablet by mouth once daily 30 tablet 2    meloxicam (MOBIC) 7.5 MG tablet take 1 tablet by mouth once daily 90 tablet 1     No current facility-administered medications on file prior to visit.        Subjective:     Review of Systems   Constitutional:  Negative for appetite change, chills, fatigue and fever.   HENT:  Negative for congestion, ear pain, rhinorrhea, sinus pain and sore throat.    Eyes:  Negative for visual disturbance.   Respiratory:  Negative for cough, chest

## 2024-01-11 NOTE — TELEPHONE ENCOUNTER
Offer her an appt with me today at 11:45 VV or F2F or can follow up with Serenity next week and stay hydrated.

## 2024-01-11 NOTE — TELEPHONE ENCOUNTER
From: Ananth Graham  To: Serenity Meléndez  Sent: 1/11/2024 9:27 AM EST  Subject: Vertigo?    Serenity-  Happy New Year! Hope all is well with you. I am writing because Tuesday morning I had what appears to be an experience of vertigo?? I was doing yoga which I regularly do but this time the room was spinning and I could get up for a bit. I also got sick (vomiting). I feel a bit off still-have to move a bit slower and can’t look up with out losing my balance. Should I make an appt or is it the type of thing that just needs to wear off? I’ve never had this before so not sure how to handle.  Thanks so much.  Ananth

## 2024-01-23 ENCOUNTER — OFFICE VISIT (OUTPATIENT)
Dept: PODIATRY | Age: 54
End: 2024-01-23
Payer: COMMERCIAL

## 2024-01-23 VITALS — BODY MASS INDEX: 22.58 KG/M2 | HEIGHT: 68 IN | WEIGHT: 149 LBS

## 2024-01-23 DIAGNOSIS — M77.52 BURSITIS OF LEFT ANKLE: ICD-10-CM

## 2024-01-23 DIAGNOSIS — Q66.89 TARSAL COALITION OF LEFT FOOT: ICD-10-CM

## 2024-01-23 DIAGNOSIS — M79.605 PAIN OF LEFT LOWER EXTREMITY: Primary | ICD-10-CM

## 2024-01-23 DIAGNOSIS — M21.42 PES PLANUS OF BOTH FEET: ICD-10-CM

## 2024-01-23 DIAGNOSIS — M21.41 PES PLANUS OF BOTH FEET: ICD-10-CM

## 2024-01-23 PROCEDURE — 99214 OFFICE O/P EST MOD 30 MIN: CPT | Performed by: PODIATRIST

## 2024-01-23 PROCEDURE — 20605 DRAIN/INJ JOINT/BURSA W/O US: CPT | Performed by: PODIATRIST

## 2024-01-23 NOTE — PROGRESS NOTES
[facility], [facility] PODIATRY  [address] [place], [place] [address]  Dept: [phone]    RETURN PATIENT PROGRESS NOTE  Date of patient's visit: 1/23/2024  Patient's Name:  Ananth Graham YOB: 1970            Patient Care Team:  Serenity Meléndez APRN - CNP as PCP - General (Certified Nurse Practitioner)  Serenity Meléndez APRN - CNP as PCP - Empaneled Provider  Jenni James MD as Consulting Physician (Endocrinology)  Rocío Alvarenga APRN - CNP (Nurse Practitioner)       Ananth Graham 53 y.o. female that presents for follow-up of   Chief Complaint   Patient presents with    Foot Pain     Left foot     Pt's primary care physician is Serenity Meléndez APRN - CNP last seen July 12 2023  Symptoms began several month(s) ago and are increased .  Patient relates pain is Present to left ankle.  Pain is rated 3 out of 10 and is described as intermittent.  Treatments prior to today's visit include: previous podiatry treatment, mri.  Currently denies F/C/N/V.     No Known Allergies    History reviewed. No pertinent past medical history.    Prior to Admission medications    Medication Sig Start Date End Date Taking? Authorizing Provider   levothyroxine (SYNTHROID) 88 MCG tablet Take 1 tablet by mouth every morning 12/26/23  Yes Batsheva Cagle MD   VEOZAH 45 MG TABS take 1 tablet by mouth once daily 11/30/23  Yes Roxane Solis APRN - CNP   escitalopram (LEXAPRO) 10 MG tablet Take 1 tablet by mouth daily 11/22/23  Yes Mera Lama MD   Magnesium Gluconate 550 MG TABS Take 550 mg by mouth nightly   Yes Batsheva Cagle MD       Review of Systems    Review of Systems:  History obtained from chart review and the patient  General ROS: negative for - chills, fatigue, fever, night sweats or weight gain  Constitutional: Negative for chills, diaphoresis, fatigue, fever and unexpected weight

## 2024-01-29 RX ORDER — BETAMETHASONE SODIUM PHOSPHATE AND BETAMETHASONE ACETATE 3; 3 MG/ML; MG/ML
6 INJECTION, SUSPENSION INTRA-ARTICULAR; INTRALESIONAL; INTRAMUSCULAR; SOFT TISSUE ONCE
Status: COMPLETED | OUTPATIENT
Start: 2024-01-29 | End: 2024-01-31

## 2024-01-31 RX ADMIN — BETAMETHASONE SODIUM PHOSPHATE AND BETAMETHASONE ACETATE 6 MG: 3; 3 INJECTION, SUSPENSION INTRA-ARTICULAR; INTRALESIONAL; INTRAMUSCULAR; SOFT TISSUE at 10:00

## 2024-02-06 DIAGNOSIS — Z78.0 POSTMENOPAUSAL: ICD-10-CM

## 2024-02-06 DIAGNOSIS — Z51.81 MEDICATION MONITORING ENCOUNTER: Primary | ICD-10-CM

## 2024-02-06 DIAGNOSIS — R23.2 HOT FLASHES: ICD-10-CM

## 2024-02-07 ENCOUNTER — TELEPHONE (OUTPATIENT)
Dept: OBGYN CLINIC | Age: 54
End: 2024-02-07

## 2024-02-07 RX ORDER — FEZOLINETANT 45 MG/1
1 TABLET, FILM COATED ORAL DAILY
Qty: 30 TABLET | Refills: 0 | Status: SHIPPED | OUTPATIENT
Start: 2024-02-07

## 2024-02-07 NOTE — TELEPHONE ENCOUNTER
Left voicemail for pt to call the office  
Medication Request/Refill Telephone Documentation:  Medication Requested: veozah   Provider last filled by: jv  Last visit: 11/27/23  Last annual/pap smear: 7/12/23    NEXT APPT 2/27/24      
Please let her know I can only give her 1 month refill she needs to check hepatic/liver panel labs as will be on for 6 months at end of this month.  Recommend complete those at least few days prior to her med check with me on 2/27/24.  At med check will refill for 3 month supply as long as hepatic function panel WNL  Thank you   
Pt notified of refill sent and labs were ordered for her to complete prior. Pt did state she would like to discuss different options at her upcoming appt. She feels like the meds were working but now they aren't.   
per PAST

## 2024-02-19 ENCOUNTER — HOSPITAL ENCOUNTER (OUTPATIENT)
Age: 54
Setting detail: SPECIMEN
Discharge: HOME | End: 2024-02-19

## 2024-02-19 DIAGNOSIS — Z00.00 PREVENTATIVE HEALTH CARE: ICD-10-CM

## 2024-02-19 DIAGNOSIS — Z51.81 MEDICATION MONITORING ENCOUNTER: ICD-10-CM

## 2024-02-19 DIAGNOSIS — E55.9 VITAMIN D DEFICIENCY: ICD-10-CM

## 2024-02-19 DIAGNOSIS — Z78.0 POSTMENOPAUSAL: ICD-10-CM

## 2024-02-19 DIAGNOSIS — R23.2 HOT FLASHES: ICD-10-CM

## 2024-02-19 DIAGNOSIS — E06.3 HASHIMOTO'S DISEASE: ICD-10-CM

## 2024-02-19 LAB
25(OH)D3 SERPL-MCNC: 41 NG/ML
ALBUMIN SERPL-MCNC: 4.2 G/DL (ref 3.5–5.2)
ALBUMIN SERPL-MCNC: 4.2 G/DL (ref 3.5–5.2)
ALBUMIN/GLOB SERPL: 1.4 {RATIO} (ref 1–2.5)
ALBUMIN/GLOB SERPL: 1.4 {RATIO} (ref 1–2.5)
ALP SERPL-CCNC: 81 U/L (ref 35–104)
ALP SERPL-CCNC: 82 U/L (ref 35–104)
ALT SERPL-CCNC: 11 U/L (ref 5–33)
ALT SERPL-CCNC: 11 U/L (ref 5–33)
ANION GAP SERPL CALCULATED.3IONS-SCNC: 12 MMOL/L (ref 9–17)
AST SERPL-CCNC: 14 U/L
AST SERPL-CCNC: 15 U/L
BILIRUB DIRECT SERPL-MCNC: 0.1 MG/DL
BILIRUB INDIRECT SERPL-MCNC: 0.4 MG/DL (ref 0–1)
BILIRUB SERPL-MCNC: 0.5 MG/DL (ref 0.3–1.2)
BILIRUB SERPL-MCNC: 0.5 MG/DL (ref 0.3–1.2)
BUN SERPL-MCNC: 19 MG/DL (ref 6–20)
CALCIUM SERPL-MCNC: 9.6 MG/DL (ref 8.6–10.4)
CHLORIDE SERPL-SCNC: 105 MMOL/L (ref 98–107)
CHOLEST SERPL-MCNC: 202 MG/DL
CHOLESTEROL/HDL RATIO: 3.5
CO2 SERPL-SCNC: 25 MMOL/L (ref 20–31)
CREAT SERPL-MCNC: 0.8 MG/DL (ref 0.5–0.9)
ERYTHROCYTE [DISTWIDTH] IN BLOOD BY AUTOMATED COUNT: 12 % (ref 11.8–14.4)
EST. AVERAGE GLUCOSE BLD GHB EST-MCNC: 100 MG/DL
GFR SERPL CREATININE-BSD FRML MDRD: >60 ML/MIN/1.73M2
GLUCOSE SERPL-MCNC: 90 MG/DL (ref 70–99)
HBA1C MFR BLD: 5.1 % (ref 4–6)
HCT VFR BLD AUTO: 41.4 % (ref 36.3–47.1)
HDLC SERPL-MCNC: 57 MG/DL
HGB BLD-MCNC: 13.5 G/DL (ref 11.9–15.1)
LDLC SERPL CALC-MCNC: 130 MG/DL (ref 0–130)
MCH RBC QN AUTO: 31.2 PG (ref 25.2–33.5)
MCHC RBC AUTO-ENTMCNC: 32.6 G/DL (ref 28.4–34.8)
MCV RBC AUTO: 95.6 FL (ref 82.6–102.9)
NRBC BLD-RTO: 0 PER 100 WBC
PLATELET # BLD AUTO: 211 K/UL (ref 138–453)
PMV BLD AUTO: 12.7 FL (ref 8.1–13.5)
POTASSIUM SERPL-SCNC: 4.5 MMOL/L (ref 3.7–5.3)
PROT SERPL-MCNC: 7.2 G/DL (ref 6.4–8.3)
PROT SERPL-MCNC: 7.2 G/DL (ref 6.4–8.3)
RBC # BLD AUTO: 4.33 M/UL (ref 3.95–5.11)
SODIUM SERPL-SCNC: 142 MMOL/L (ref 135–144)
TRIGL SERPL-MCNC: 75 MG/DL
TSH SERPL DL<=0.05 MIU/L-ACNC: 1.97 UIU/ML (ref 0.3–5)
WBC OTHER # BLD: 4.3 K/UL (ref 3.5–11.3)

## 2024-02-20 ENCOUNTER — OFFICE VISIT (OUTPATIENT)
Dept: PODIATRY | Age: 54
End: 2024-02-20
Payer: COMMERCIAL

## 2024-02-20 VITALS — WEIGHT: 149 LBS | HEIGHT: 68 IN | BODY MASS INDEX: 22.58 KG/M2

## 2024-02-20 DIAGNOSIS — M77.52 BURSITIS OF LEFT ANKLE: ICD-10-CM

## 2024-02-20 DIAGNOSIS — M21.42 PES PLANUS OF BOTH FEET: ICD-10-CM

## 2024-02-20 DIAGNOSIS — M79.605 PAIN OF LEFT LOWER EXTREMITY: Primary | ICD-10-CM

## 2024-02-20 DIAGNOSIS — Q66.89 TARSAL COALITION OF LEFT FOOT: ICD-10-CM

## 2024-02-20 DIAGNOSIS — M21.41 PES PLANUS OF BOTH FEET: ICD-10-CM

## 2024-02-20 PROCEDURE — 20605 DRAIN/INJ JOINT/BURSA W/O US: CPT | Performed by: PODIATRIST

## 2024-02-20 PROCEDURE — 99214 OFFICE O/P EST MOD 30 MIN: CPT | Performed by: PODIATRIST

## 2024-02-21 DIAGNOSIS — R23.2 HOT FLASHES: ICD-10-CM

## 2024-02-21 DIAGNOSIS — Z78.0 POSTMENOPAUSAL: ICD-10-CM

## 2024-02-26 RX ORDER — BETAMETHASONE SODIUM PHOSPHATE AND BETAMETHASONE ACETATE 3; 3 MG/ML; MG/ML
6 INJECTION, SUSPENSION INTRA-ARTICULAR; INTRALESIONAL; INTRAMUSCULAR; SOFT TISSUE ONCE
Status: COMPLETED | OUTPATIENT
Start: 2024-02-26 | End: 2024-02-28

## 2024-02-26 NOTE — PROGRESS NOTES
[facility], [facility] PODIATRY  [address] [place], [place] [address]  Dept: [phone]    RETURN PATIENT PROGRESS NOTE  Date of patient's visit: 2/26/2024  Patient's Name:  Ananth Graham YOB: 1970            Patient Care Team:  Serenity Meléndez APRN - CNP as PCP - General (Certified Nurse Practitioner)  Serenity Meléndez APRN - CNP as PCP - Empaneled Provider  Jenni James MD as Consulting Physician (Endocrinology)  Rocío Alvarenga APRN - CNP (Nurse Practitioner)       Ananth Graham 53 y.o. female that presents for follow-up of   Chief Complaint   Patient presents with    Foot Pain     Left foot     Pt's primary care physician is Serenity Meléndez APRN - CNP last seen July 12 2023  Symptoms began several month(s) ago and are decreased .  Patient relates pain is Present to left ankle.  Pain is rated 3 out of 10 and is described as intermittent.  Treatments prior to today's visit include: previous podiatry treatment, mri.  Currently denies F/C/N/V.     No Known Allergies    History reviewed. No pertinent past medical history.    Prior to Admission medications    Medication Sig Start Date End Date Taking? Authorizing Provider   fezolinetant (VEOZAH) 45 MG TABS Take 1 tablet by mouth daily 2/7/24  Yes Roxane Solis APRN - CNP   levothyroxine (SYNTHROID) 88 MCG tablet Take 1 tablet by mouth every morning 12/26/23  Yes Batsheva Cagle MD   escitalopram (LEXAPRO) 10 MG tablet Take 1 tablet by mouth daily 11/22/23  Yes Mera Lama MD   Magnesium Gluconate 550 MG TABS Take 550 mg by mouth nightly   Yes Batsheva Cagle MD       Review of Systems    Review of Systems:  History obtained from chart review and the patient  General ROS: negative for - chills, fatigue, fever, night sweats or weight gain  Constitutional: Negative for chills, diaphoresis, fatigue, fever and unexpected weight 
for arthralgias, gait problem and joint swelling.  Neurological ROS: negative for - behavioral changes, confusion, headaches or seizures. Negative for weakness and numbness.   Dermatological ROS: negative for - mole changes, rash  Cardiovascular: Negative for leg swelling.   Gastrointestinal: Negative for constipation, diarrhea, nausea and vomiting.         Lower Extremity Physical Examination:     Vitals: There were no vitals filed for this visit.  General: AAO x 3 in NAD.     Integument:   Warm, dry, supple, {RIGHT LEFT BILATERAL:98006}.  Open lesion {DESC; PRESENT/ABSENT:05889}, {RIGHT LEFT BILATERAL:07660}.  Interdigital maceration {DESC; PRESENT/ABSENT:46354} to web spaces {NUMBERS 1-4:27122}, {RIGHT LEFT BILATERAL:40969}.  Nails {Numbers; 1-5:67857} left and {Numbers; 1-5:26909} right thickened > {NUMBERS; 0.25-3 BY 0.25:42508} mm, dystrophic and crumbly, discolored with subungual debris.  Fissures {DESC; PRESENT/ABSENT:81956}, {RIGHT LEFT BILATERAL:09648}.       Vascular: DP and PT pulses palpable {NUMBERS 0-4:20826}/4, {RIGHT LEFT BILATERAL:40307}.  CFT <{Numbers; 1-5:48950} seconds, {RIGHT LEFT BILATERAL:15803}.  Hair growth {DESC; PRESENT/ABSENT:27140} to the level of the digits, {RIGHT LEFT BILATERAL:26095}.  Edema {DESC; PRESENT/ABSENT:23757}, {RIGHT LEFT BILATERAL:68941}.  Varicosities {DESC; PRESENT/ABSENT:75019}, {RIGHT LEFT BILATERAL:96086}. Erythema {DESC; PRESENT/ABSENT:81592}, {RIGHT LEFT BILATERAL:91515}    Neurological:  Sensation {DESC; PRESENT/ABSENT:77529::\"present\"} to light touch to level of digits, {RIGHT LEFT BILATERAL:33447}.  Protective sensation {DESC; PRESENT/ABSENT:17923::\"present\"} via 5.07/10g [place] monofilament {NUMBER 1-10:[id number]}/10 sites, {RIGHT LEFT BILATERAL:37201}.  Vibratory sensation {DESC; PRESENT/ABSENT:16500::\"present\"} to 1st MPJ, {RIGHT LEFT BILATERAL:97119}.     Musculoskeletal: Muscle strength {Numbers; 1-5:48974}/5, {RIGHT LEFT BILATERAL:45235}.

## 2024-02-27 ENCOUNTER — OFFICE VISIT (OUTPATIENT)
Dept: OBGYN CLINIC | Age: 54
End: 2024-02-27
Payer: COMMERCIAL

## 2024-02-27 VITALS
BODY MASS INDEX: 22.28 KG/M2 | DIASTOLIC BLOOD PRESSURE: 68 MMHG | HEIGHT: 68 IN | SYSTOLIC BLOOD PRESSURE: 108 MMHG | WEIGHT: 147 LBS

## 2024-02-27 DIAGNOSIS — R23.2 HOT FLASHES: Primary | ICD-10-CM

## 2024-02-27 DIAGNOSIS — R53.83 FATIGUE, UNSPECIFIED TYPE: ICD-10-CM

## 2024-02-27 DIAGNOSIS — E34.9 HORMONE IMBALANCE: ICD-10-CM

## 2024-02-27 DIAGNOSIS — Z78.0 POSTMENOPAUSAL: ICD-10-CM

## 2024-02-27 PROCEDURE — 99214 OFFICE O/P EST MOD 30 MIN: CPT | Performed by: NURSE PRACTITIONER

## 2024-02-27 RX ORDER — FEZOLINETANT 45 MG/1
1 TABLET, FILM COATED ORAL DAILY
Qty: 30 TABLET | Refills: 1 | Status: SHIPPED | OUTPATIENT
Start: 2024-02-27

## 2024-02-27 NOTE — PROGRESS NOTES
considering compound HRT though given her other symptoms to see if would help.   Discussed compound HRT is not FDA approved and would be off label for treatment of postmenopausal vasomotor symptoms, but if she would want this there is no contraindication, but she would need to stop Veozah if decided to started Compound HRT as estradiol is considered a CYPA12 inhibitor and contraindicated with Veozah.     She requested labs for hormone imbalance and wants to consider at least consult with Compound pharmacy.Discussed if starts HRT she is at increased risk of breast cancer, cardiac events, strokes and blood clots. Does not want to stop HRT at this time, she signed consent for HRT.        Follow up annual 5 months PAP and depending on if starts HRT     Ananth received counseling on the following healthy behaviors: medication adherence    Patient given educational materials on HRT    Was a self-tracking handout given in paper form or via [employer]? No  If yes, see orders or list here.    Discussed use, benefit, and side effects of prescribed medications.  Barriers to medication compliance addressed.  All patient questions answered.  Pt voiced understanding.     This note is created with the assistance of a speech-recognition program.  While intending to generate a document that actually reflects the content of the visit, no guarantees can be provided that every mistake has been identified and corrected by editing.    Of the 30 minute duration appointment visit, Roxane Solis CNP spent at least 50% of the face-to-face time in counseling, explanation of diagnosis, planning of further management, and answering all questions.

## 2024-02-28 RX ADMIN — BETAMETHASONE SODIUM PHOSPHATE AND BETAMETHASONE ACETATE 6 MG: 3; 3 INJECTION, SUSPENSION INTRA-ARTICULAR; INTRALESIONAL; INTRAMUSCULAR; SOFT TISSUE at 09:59

## 2024-03-04 ENCOUNTER — HOSPITAL ENCOUNTER (OUTPATIENT)
Age: 54
Setting detail: SPECIMEN
Discharge: HOME | End: 2024-03-04

## 2024-03-04 DIAGNOSIS — R23.2 HOT FLASHES: ICD-10-CM

## 2024-03-04 DIAGNOSIS — Z78.0 POSTMENOPAUSAL: ICD-10-CM

## 2024-03-04 DIAGNOSIS — E34.9 HORMONE IMBALANCE: ICD-10-CM

## 2024-03-04 DIAGNOSIS — Z79.899 MEDICATION MANAGEMENT: ICD-10-CM

## 2024-03-04 LAB
25(OH)D3 SERPL-MCNC: 50.5 NG/ML
ALBUMIN SERPL-MCNC: 4.4 G/DL (ref 3.5–5.2)
ALBUMIN/GLOB SERPL: 1.5 {RATIO} (ref 1–2.5)
ALP SERPL-CCNC: 82 U/L (ref 35–104)
ALT SERPL-CCNC: 10 U/L (ref 5–33)
ANION GAP SERPL CALCULATED.3IONS-SCNC: 14 MMOL/L (ref 9–17)
AST SERPL-CCNC: 18 U/L
BILIRUB SERPL-MCNC: 0.5 MG/DL (ref 0.3–1.2)
BUN SERPL-MCNC: 14 MG/DL (ref 6–20)
CALCIUM SERPL-MCNC: 9.5 MG/DL (ref 8.6–10.4)
CHLORIDE SERPL-SCNC: 103 MMOL/L (ref 98–107)
CO2 SERPL-SCNC: 24 MMOL/L (ref 20–31)
CORTIS SERPL-MCNC: 9.1 UG/DL (ref 2.7–18.4)
CORTISOL COLLECTION INFO: NORMAL
CREAT SERPL-MCNC: 0.7 MG/DL (ref 0.5–0.9)
GFR SERPL CREATININE-BSD FRML MDRD: >60 ML/MIN/1.73M2
GLUCOSE SERPL-MCNC: 84 MG/DL (ref 70–99)
POTASSIUM SERPL-SCNC: 4.5 MMOL/L (ref 3.7–5.3)
PROT SERPL-MCNC: 7.4 G/DL (ref 6.4–8.3)
SHBG SERPL-SCNC: 83 NMOL/L (ref 30–135)
SODIUM SERPL-SCNC: 141 MMOL/L (ref 135–144)
TESTOST FREE MFR SERPL: <1 PG/ML (ref 1.1–5.8)
TESTOST SERPL-MCNC: 3 NG/DL (ref 20–70)
TESTOSTERONE, BIOAVAILABLE: <0.1 NG/DL (ref 2.8–16.5)
TSH SERPL DL<=0.05 MIU/L-ACNC: 2.31 UIU/ML (ref 0.3–5)

## 2024-03-05 LAB — DHEA-S SERPL-MCNC: 135 UG/DL (ref 35.4–256)

## 2024-03-06 LAB
ESTRADIOL LEVEL: <5 PG/ML
PROGEST SERPL-MCNC: <0.05 NG/ML

## 2024-03-13 LAB — ESTRONE SERPL-MCNC: 8.6 PG/ML

## 2024-03-28 SDOH — ECONOMIC STABILITY: FOOD INSECURITY: WITHIN THE PAST 12 MONTHS, YOU WORRIED THAT YOUR FOOD WOULD RUN OUT BEFORE YOU GOT MONEY TO BUY MORE.: NEVER TRUE

## 2024-03-28 SDOH — ECONOMIC STABILITY: INCOME INSECURITY: HOW HARD IS IT FOR YOU TO PAY FOR THE VERY BASICS LIKE FOOD, HOUSING, MEDICAL CARE, AND HEATING?: NOT HARD AT ALL

## 2024-03-28 SDOH — ECONOMIC STABILITY: FOOD INSECURITY: WITHIN THE PAST 12 MONTHS, THE FOOD YOU BOUGHT JUST DIDN'T LAST AND YOU DIDN'T HAVE MONEY TO GET MORE.: NEVER TRUE

## 2024-03-28 ASSESSMENT — PATIENT HEALTH QUESTIONNAIRE - PHQ9
5. POOR APPETITE OR OVEREATING: NOT AT ALL
SUM OF ALL RESPONSES TO PHQ QUESTIONS 1-9: 2
6. FEELING BAD ABOUT YOURSELF - OR THAT YOU ARE A FAILURE OR HAVE LET YOURSELF OR YOUR FAMILY DOWN: NOT AT ALL
7. TROUBLE CONCENTRATING ON THINGS, SUCH AS READING THE NEWSPAPER OR WATCHING TELEVISION: NOT AT ALL
3. TROUBLE FALLING OR STAYING ASLEEP: MORE THAN HALF THE DAYS
8. MOVING OR SPEAKING SO SLOWLY THAT OTHER PEOPLE COULD HAVE NOTICED. OR THE OPPOSITE, BEING SO FIGETY OR RESTLESS THAT YOU HAVE BEEN MOVING AROUND A LOT MORE THAN USUAL: NOT AT ALL
1. LITTLE INTEREST OR PLEASURE IN DOING THINGS: NOT AT ALL
SUM OF ALL RESPONSES TO PHQ QUESTIONS 1-9: 2
4. FEELING TIRED OR HAVING LITTLE ENERGY: NOT AT ALL
2. FEELING DOWN, DEPRESSED OR HOPELESS: NOT AT ALL
9. THOUGHTS THAT YOU WOULD BE BETTER OFF DEAD, OR OF HURTING YOURSELF: NOT AT ALL
3. TROUBLE FALLING OR STAYING ASLEEP: MORE THAN HALF THE DAYS
SUM OF ALL RESPONSES TO PHQ9 QUESTIONS 1 & 2: 0
SUM OF ALL RESPONSES TO PHQ QUESTIONS 1-9: 2
8. MOVING OR SPEAKING SO SLOWLY THAT OTHER PEOPLE COULD HAVE NOTICED. OR THE OPPOSITE - BEING SO FIDGETY OR RESTLESS THAT YOU HAVE BEEN MOVING AROUND A LOT MORE THAN USUAL: NOT AT ALL
SUM OF ALL RESPONSES TO PHQ QUESTIONS 1-9: 2
7. TROUBLE CONCENTRATING ON THINGS, SUCH AS READING THE NEWSPAPER OR WATCHING TELEVISION: NOT AT ALL
10. IF YOU CHECKED OFF ANY PROBLEMS, HOW DIFFICULT HAVE THESE PROBLEMS MADE IT FOR YOU TO DO YOUR WORK, TAKE CARE OF THINGS AT HOME, OR GET ALONG WITH OTHER PEOPLE: NOT DIFFICULT AT ALL
5. POOR APPETITE OR OVEREATING: NOT AT ALL
1. LITTLE INTEREST OR PLEASURE IN DOING THINGS: NOT AT ALL
6. FEELING BAD ABOUT YOURSELF - OR THAT YOU ARE A FAILURE OR HAVE LET YOURSELF OR YOUR FAMILY DOWN: NOT AT ALL
10. IF YOU CHECKED OFF ANY PROBLEMS, HOW DIFFICULT HAVE THESE PROBLEMS MADE IT FOR YOU TO DO YOUR WORK, TAKE CARE OF THINGS AT HOME, OR GET ALONG WITH OTHER PEOPLE: NOT DIFFICULT AT ALL
SUM OF ALL RESPONSES TO PHQ QUESTIONS 1-9: 2
4. FEELING TIRED OR HAVING LITTLE ENERGY: NOT AT ALL
9. THOUGHTS THAT YOU WOULD BE BETTER OFF DEAD, OR OF HURTING YOURSELF: NOT AT ALL
2. FEELING DOWN, DEPRESSED OR HOPELESS: NOT AT ALL

## 2024-03-29 ENCOUNTER — OFFICE VISIT (OUTPATIENT)
Dept: FAMILY MEDICINE CLINIC | Age: 54
End: 2024-03-29
Payer: COMMERCIAL

## 2024-03-29 VITALS
HEART RATE: 73 BPM | BODY MASS INDEX: 22.35 KG/M2 | SYSTOLIC BLOOD PRESSURE: 106 MMHG | TEMPERATURE: 98.1 F | RESPIRATION RATE: 16 BRPM | WEIGHT: 147 LBS | DIASTOLIC BLOOD PRESSURE: 70 MMHG | OXYGEN SATURATION: 97 %

## 2024-03-29 DIAGNOSIS — M54.2 NECK PAIN: Primary | ICD-10-CM

## 2024-03-29 DIAGNOSIS — H65.93 FLUID LEVEL BEHIND TYMPANIC MEMBRANE OF BOTH EARS: ICD-10-CM

## 2024-03-29 PROCEDURE — 99213 OFFICE O/P EST LOW 20 MIN: CPT | Performed by: NURSE PRACTITIONER

## 2024-03-29 RX ORDER — PREDNISONE 10 MG/1
TABLET ORAL
Qty: 18 TABLET | Refills: 0 | Status: SHIPPED | OUTPATIENT
Start: 2024-03-29 | End: 2024-04-08

## 2024-03-29 ASSESSMENT — ENCOUNTER SYMPTOMS
COLOR CHANGE: 0
ABDOMINAL PAIN: 0
BACK PAIN: 0
NAUSEA: 0
SORE THROAT: 0
DIARRHEA: 0
SHORTNESS OF BREATH: 0
ABDOMINAL DISTENTION: 0
RHINORRHEA: 0
CONSTIPATION: 0
CHEST TIGHTNESS: 0
COUGH: 0

## 2024-03-29 NOTE — PROGRESS NOTES
reviewing previous notes, test results and face to face with the patient discussing the diagnosis and importance of compliance with the treatment plan as well as documenting on the day of the visit.    KAELA Castellanos-CNP

## 2024-04-09 ENCOUNTER — HOSPITAL ENCOUNTER (OUTPATIENT)
Dept: PHYSICAL THERAPY | Facility: CLINIC | Age: 54
Setting detail: THERAPIES SERIES
Discharge: HOME | End: 2024-04-09

## 2024-04-09 NOTE — FLOWSHEET NOTE
[] [facility]  Outpatient Rehabilitation &  Therapy  [address]  [phone]  [phone] [] [facility]  Outpatient Rehabilitation &  Therapy  [address]  P: [phone]  F: [phone] [x] [facility]  Outpatient Rehabilitation &  Therapy  [address]  P: [phone]  F: [phone] [] [facility]  Outpatient Rehabilitation &  Therapy  [address]  [phone]  [phone] [] [facility]  Outpatient Rehabilitation &  Therapy  [address]   P: [phone]  F: [phone]  [] [facility]  Outpatient Rehabilitation &  Therapy  [address]  P: [phone]  F: [phone] [] [facility]  Outpatient Rehabilitation &  Therapy  [address]  P: [phone]  F: [phone] [] [facility]  Outpatient Rehabilitation &  Therapy  [address]  P: [phone]  F: [phone] [] [facility]  Outpatient Rehabilitation &  Therapy  [address]  P: [phone]  F: [phone]  [] [facility] Outpatient Rehabilitation &  Therapy  [address]  P: [phone]  F: [phone]     Therapy Cancel/No Show note    Date: 2024  Patient: Ananth Graham  : 1970  MRN: [id number]    Cancels/No Shows to date:  - initial evaluation.     For today's appointment patient:    [x]  Cancelled    [] Rescheduled appointment    [] No-show     Reason given by patient:    []  Patient ill    []  Conflicting appointment    [] No transportation      [] Conflict with work    [] No reason given    [] Weather related    [] COVID-19    [] Other:      Comments: Pt stated no longer needing therapy due to no longer having pain and discussed it with Doctor.        [] Next appointment was confirmed    Electronically signed by: Zelda Julian

## 2024-04-19 ENCOUNTER — TELEPHONE (OUTPATIENT)
Dept: OBGYN CLINIC | Age: 54
End: 2024-04-19

## 2024-04-19 NOTE — TELEPHONE ENCOUNTER
Please notify patient that I signed her compound HRT she is just starting this.  She has Pap scheduled in July please encourage her to keep that appointment we will do med check at the same time thank you

## 2024-04-23 DIAGNOSIS — Q66.89 TARSAL COALITION OF LEFT FOOT: ICD-10-CM

## 2024-04-23 DIAGNOSIS — M21.41 PES PLANUS OF BOTH FEET: Primary | ICD-10-CM

## 2024-04-23 DIAGNOSIS — S93.402A MODERATE ANKLE SPRAIN, LEFT, INITIAL ENCOUNTER: ICD-10-CM

## 2024-04-23 DIAGNOSIS — M21.42 PES PLANUS OF BOTH FEET: Primary | ICD-10-CM

## 2024-05-23 DIAGNOSIS — F41.9 ANXIETY AND DEPRESSION: ICD-10-CM

## 2024-05-23 DIAGNOSIS — F32.A ANXIETY AND DEPRESSION: ICD-10-CM

## 2024-05-23 RX ORDER — ESCITALOPRAM OXALATE 10 MG/1
10 TABLET ORAL DAILY
Qty: 90 TABLET | Refills: 1 | Status: SHIPPED | OUTPATIENT
Start: 2024-05-23

## 2024-05-23 NOTE — TELEPHONE ENCOUNTER
Please Approve or Refuse.  Send to Pharmacy per Pt's Request:      Next Visit Date:  7/12/2024   Last Visit Date: 3/29/2024    Hemoglobin A1C (%)   Date Value   02/19/2024 5.1   07/19/2022 5.4             ( goal A1C is < 7)   BP Readings from Last 3 Encounters:   03/29/24 106/70   02/27/24 108/68   11/27/23 118/64          (goal 120/80)  BUN   Date Value Ref Range Status   03/04/2024 14 6 - 20 mg/dL Final     Creatinine   Date Value Ref Range Status   03/04/2024 0.7 0.5 - 0.9 mg/dL Final     Potassium   Date Value Ref Range Status   03/04/2024 4.5 3.7 - 5.3 mmol/L Final

## 2024-06-30 ENCOUNTER — PATIENT MESSAGE (OUTPATIENT)
Dept: OBGYN CLINIC | Age: 54
End: 2024-06-30

## 2024-07-01 RX ORDER — PROGESTERONE 100 MG/1
100 CAPSULE ORAL DAILY
Qty: 30 CAPSULE | Refills: 1 | Status: SHIPPED | OUTPATIENT
Start: 2024-07-01

## 2024-07-01 NOTE — TELEPHONE ENCOUNTER
From: Ananth Graham  To: Roxane Solis  Sent: 6/30/2024 7:02 AM EDT  Subject: HRT    Good morning. I have been working with [facility] and have been using a HRT cream for the last two months. I have not seen results and was thinking I needed a higher dose. After a bit more research I think I would rather do an estrogen patch approach. I was wondering if it would be possible to switch before my appt with you next month?  Thanks for your help.  Ananth Graham

## 2024-07-09 ENCOUNTER — OFFICE VISIT (OUTPATIENT)
Dept: PODIATRY | Age: 54
End: 2024-07-09
Payer: COMMERCIAL

## 2024-07-09 VITALS — BODY MASS INDEX: 22.28 KG/M2 | HEIGHT: 68 IN | WEIGHT: 147 LBS

## 2024-07-09 DIAGNOSIS — M76.822 POSTERIOR TIBIAL TENDON DYSFUNCTION (PTTD) OF LEFT LOWER EXTREMITY: Primary | ICD-10-CM

## 2024-07-09 DIAGNOSIS — M79.672 PAIN IN LEFT FOOT: ICD-10-CM

## 2024-07-09 DIAGNOSIS — M25.872 IMPINGEMENT SYNDROME OF LEFT ANKLE: ICD-10-CM

## 2024-07-09 DIAGNOSIS — M21.42 PES PLANOVALGUS, ACQUIRED, LEFT: ICD-10-CM

## 2024-07-09 PROCEDURE — 99203 OFFICE O/P NEW LOW 30 MIN: CPT | Performed by: PODIATRIST

## 2024-07-09 ASSESSMENT — ENCOUNTER SYMPTOMS
SHORTNESS OF BREATH: 0
NAUSEA: 0
COLOR CHANGE: 0
DIARRHEA: 0
BACK PAIN: 0

## 2024-07-09 NOTE — PROGRESS NOTES
Ananth Graham is a 54 y.o. female who presents to the office today with chief complaint of pain to the outside of the left foot and ankle.  Chief Complaint   Patient presents with    Foot Pain     Left foot pain    Other     Casting for self pay orthotics    Symptoms began about 1 year(s) ago. Patient denies injury to the left foot or ankle. Patient states that the pain comes and goes with increased activity. Pain is rated 9 out of 10 at it's worst and is described as intermittent. Treatments prior to today's visit include: Patient states that she has had two steroid injections, but neither helped.      No Known Allergies    Past Medical History:   Diagnosis Date    Headache     Hypothyroidism     Restless legs syndrome        Prior to Admission medications    Medication Sig Start Date End Date Taking? Authorizing Provider   estradiol (CLIMARA) 0.025 MG/24HR Place 1 patch onto the skin every 7 days 7/1/24  Yes Roxane Solis APRN - CNP   progesterone (PROMETRIUM) 100 MG CAPS capsule Take 1 capsule by mouth daily Take 2 tablets by mouth daily for 3 days then 1 tablet by mouth for 7 days 7/1/24  Yes Roxane Solis APRN - CNP   escitalopram (LEXAPRO) 10 MG tablet take 1 tablet by mouth once daily 5/23/24  Yes Serenity Meléndez APRN - CNP   levothyroxine (SYNTHROID) 88 MCG tablet Take 1 tablet by mouth every morning 12/26/23  Yes ProviderBatsheva MD   Magnesium Gluconate 550 MG TABS Take 550 mg by mouth nightly   Yes ProviderBatsheva MD       Past Surgical History:   Procedure Laterality Date    APPENDECTOMY      COLONOSCOPY N/A 10/18/2023    COLONOSCOPY N/A 10/18/2023    COLORECTAL CANCER SCREENING, NOT HIGH RISK performed by Brady Segovia MD at [facility] OR       Family History   Problem Relation Age of Onset    Heart Disease Maternal Grandmother     Diabetes Paternal Grandfather        Social History     Tobacco Use    Smoking status: Never    Smokeless tobacco: Never   Substance

## 2024-07-12 ENCOUNTER — OFFICE VISIT (OUTPATIENT)
Dept: FAMILY MEDICINE CLINIC | Age: 54
End: 2024-07-12
Payer: COMMERCIAL

## 2024-07-12 VITALS
RESPIRATION RATE: 16 BRPM | OXYGEN SATURATION: 98 % | HEART RATE: 64 BPM | DIASTOLIC BLOOD PRESSURE: 74 MMHG | BODY MASS INDEX: 22.81 KG/M2 | SYSTOLIC BLOOD PRESSURE: 106 MMHG | WEIGHT: 150 LBS | TEMPERATURE: 98 F

## 2024-07-12 DIAGNOSIS — N95.1 PERIMENOPAUSAL: ICD-10-CM

## 2024-07-12 DIAGNOSIS — F41.9 ANXIETY AND DEPRESSION: ICD-10-CM

## 2024-07-12 DIAGNOSIS — F51.04 PSYCHOPHYSIOLOGICAL INSOMNIA: ICD-10-CM

## 2024-07-12 DIAGNOSIS — Z00.00 ANNUAL PHYSICAL EXAM: Primary | ICD-10-CM

## 2024-07-12 DIAGNOSIS — E06.3 HASHIMOTO'S DISEASE: ICD-10-CM

## 2024-07-12 DIAGNOSIS — F32.A ANXIETY AND DEPRESSION: ICD-10-CM

## 2024-07-12 DIAGNOSIS — M25.50 ARTHRALGIA, UNSPECIFIED JOINT: ICD-10-CM

## 2024-07-12 DIAGNOSIS — Z12.31 SCREENING MAMMOGRAM FOR BREAST CANCER: ICD-10-CM

## 2024-07-12 DIAGNOSIS — E55.9 VITAMIN D DEFICIENCY: ICD-10-CM

## 2024-07-12 PROCEDURE — 99396 PREV VISIT EST AGE 40-64: CPT | Performed by: NURSE PRACTITIONER

## 2024-07-12 ASSESSMENT — ENCOUNTER SYMPTOMS
SORE THROAT: 0
ABDOMINAL PAIN: 0
ABDOMINAL DISTENTION: 0
DIARRHEA: 0
CHEST TIGHTNESS: 0
BACK PAIN: 0
COUGH: 0
RHINORRHEA: 0
CONSTIPATION: 0
COLOR CHANGE: 0
NAUSEA: 0
SHORTNESS OF BREATH: 0

## 2024-07-12 NOTE — PROGRESS NOTES
Magnesium Gluconate 550 MG TABS Take 550 mg by mouth nightly       No current facility-administered medications on file prior to visit.        Subjective:     Review of Systems   Constitutional:  Negative for activity change, fatigue and fever.   HENT:  Negative for congestion, ear pain, rhinorrhea and sore throat.    Respiratory:  Negative for cough, chest tightness and shortness of breath.    Cardiovascular:  Negative for chest pain and palpitations.   Gastrointestinal:  Negative for abdominal distention, abdominal pain, constipation, diarrhea and nausea.   Endocrine: Negative for polydipsia, polyphagia and polyuria.   Genitourinary:  Negative for difficulty urinating and dysuria.   Musculoskeletal:  Negative for arthralgias, back pain and myalgias.   Skin:  Negative for color change and rash.   Neurological:  Negative for dizziness, weakness, light-headedness and headaches.   Hematological:  Negative for adenopathy.   Psychiatric/Behavioral:  Negative for agitation and behavioral problems. The patient is not nervous/anxious.      Vitals:    07/12/24 0719   BP: 106/74   Pulse: 64   Resp: 16   Temp: 98 °F (36.7 °C)   SpO2: 98%     Objective:     Physical Exam  Vitals reviewed.   Constitutional:       General: She is not in acute distress.     Appearance: Normal appearance.   HENT:      Head: Normocephalic and atraumatic.      Right Ear: External ear normal.      Left Ear: External ear normal.      Nose: Nose normal.      Mouth/Throat:      Mouth: Mucous membranes are moist.      Pharynx: No oropharyngeal exudate or posterior oropharyngeal erythema.   Eyes:      Extraocular Movements: Extraocular movements intact.      Conjunctiva/sclera: Conjunctivae normal.      Pupils: Pupils are equal, round, and reactive to light.   Cardiovascular:      Rate and Rhythm: Normal rate and regular rhythm.      Pulses: Normal pulses.      Heart sounds: Normal heart sounds. No murmur heard.  Pulmonary:      Effort: Pulmonary effort

## 2024-07-12 NOTE — PATIENT INSTRUCTIONS
Health Maintenance Recommendations  Exercise   I generally recommend that people of all ages try to get 150 minutes of physical activity per week and it doesn’t matter how this totals up, in other words 30 minutes 5 days per week is as good as 50 minutes 3 days a week and so on.    The level of activity should be such that it is able to get your heart rate up to 100 or more, for example a brisk walk should achieve this rate.   Dietary Recommendations  In terms of diet, I generally recommend trying to eat a healthy well balanced diet full of fruits and vegetables. Avoid carbonated drinks and fruit juices and limit your alcohol use.   Avoid processed foods wherever possible (anything that comes in a can or a box) which can be achieved by sticking to the outside walls of the grocery store where generally you will find fresh fruits/vegetables, meats, dairy, and frozen foods.    Try to avoid starches in the diet where possible and minimize bread, rice, potatoes, and pasta in the diet.  Specifically try to avoid gluten, which even in people that don’t have a vinod allergy, causes havoc in the small intestine and alters absorption of nutrients which can in turn lead to obesity.   Sleep  Try to achieve a regular sleep schedule, waking and laying down at the same time each night.  Most people need 7 hours per night plus or minus 2 hours.    You will know that you’re getting enough because you will wake feeling refreshed and not need to sleep in to catch up on weekends.   Skin Care  Make sure that you don’t neglect your skin.    Play it safe in the sun. Use a sunblock on all of your exposed skin.   The sunblock should be broad spectrum and water resistant.    I do recommend an SPF 30 or higher sun screen any time that you plan to be in the sun for more than 20 minutes, even in the winter or on cloudy days (keep in mind that UV light penetrates clouds and can cause burns even on cloudy days).   Apply 20 to 30 minutes before

## 2024-07-15 ENCOUNTER — NURSE ONLY (OUTPATIENT)
Dept: PODIATRY | Age: 54
End: 2024-07-15

## 2024-07-15 ENCOUNTER — OFFICE VISIT (OUTPATIENT)
Dept: OBGYN CLINIC | Age: 54
End: 2024-07-15
Payer: COMMERCIAL

## 2024-07-15 ENCOUNTER — HOSPITAL ENCOUNTER (OUTPATIENT)
Age: 54
Setting detail: SPECIMEN
Discharge: HOME | End: 2024-07-15

## 2024-07-15 VITALS
DIASTOLIC BLOOD PRESSURE: 62 MMHG | BODY MASS INDEX: 23.04 KG/M2 | WEIGHT: 152 LBS | SYSTOLIC BLOOD PRESSURE: 104 MMHG | HEIGHT: 68 IN

## 2024-07-15 DIAGNOSIS — Z01.419 WELL WOMAN EXAM WITH ROUTINE GYNECOLOGICAL EXAM: Primary | ICD-10-CM

## 2024-07-15 PROCEDURE — 99396 PREV VISIT EST AGE 40-64: CPT | Performed by: NURSE PRACTITIONER

## 2024-07-15 RX ORDER — PROGESTERONE 100 MG/1
100 CAPSULE ORAL DAILY
Qty: 30 CAPSULE | Refills: 5 | Status: SHIPPED | OUTPATIENT
Start: 2024-07-15

## 2024-07-15 ASSESSMENT — ENCOUNTER SYMPTOMS
COUGH: 0
VOMITING: 0
COLOR CHANGE: 0
NAUSEA: 0
SHORTNESS OF BREATH: 0

## 2024-07-15 NOTE — PROGRESS NOTES
Chaperone for Intimate Exam  Chaperone was offered as part of the rooming process. Patient declined and agrees to continue with exam without a chaperone.  Chaperone: n/a      
Skin is warm and dry.      Findings: No rash.   Neurological:      Mental Status: She is alert and oriented to person, place, and time.      Cranial Nerves: No cranial nerve deficit.      Deep Tendon Reflexes: Reflexes are normal and symmetric.   Psychiatric:         Behavior: Behavior normal.         Thought Content: Thought content normal.         Judgment: Judgment normal.       /62 (Site: Right Upper Arm, Position: Sitting, Cuff Size: Medium Adult)   Ht 1.727 m (5' 8\")   Wt 68.9 kg (152 lb)   BMI 23.11 kg/m²     Assessment:   Assessment & Plan    Diagnosis Orders   1. Well woman exam with routine gynecological exam  PAP Smear          Breast exam completed.   Pelvic exam pap smear collected and sent. Cultures sent No    Plan:   Collect pap   BSE reviewed, Mammogram ordered: has scheduled   STD prevention reviewed  Cultures declined   Postmenopausal vasomotor symptoms-she has not been on the patch and oral progesterone and she seems to notice some improvement although she wants to monitor she may consider increasing her patch dosage she will let us know if the next couple weeks here.  On HRT-Discussed she is at increased risk of breast cancer, cardiac events, strokes and blood clots. Does not want to stop HRT at this time, she signed consent for HRT in February 2024    Diet & Exercise reviewed with pt.  Recommend Calcium with Vitamin D   Recommend colon cancer screening initiated between ages 45-50-UTD  Preventive  Health through PCP   RV prn/annual           Orders Placed This Encounter   Procedures    PAP Smear     Patient History:    No LMP recorded. (Menstrual status: Menopause).  OBGYN Status: Menopause  Past Surgical History:  No date: APPENDECTOMY  10/18/2023: COLONOSCOPY; N/A  10/18/2023: COLONOSCOPY; N/A      Comment:  COLORECTAL CANCER SCREENING, NOT HIGH RISK performed by                Brady Segovia MD at [facility] OR  Medications/Contraceptives Affecting Cytology     Estrogens Disp

## 2024-07-18 ENCOUNTER — HOSPITAL ENCOUNTER (OUTPATIENT)
Dept: MAMMOGRAPHY | Age: 54
Discharge: HOME | End: 2024-07-20
Payer: COMMERCIAL

## 2024-07-18 VITALS — BODY MASS INDEX: 21.98 KG/M2 | WEIGHT: 145 LBS | HEIGHT: 68 IN

## 2024-07-18 DIAGNOSIS — Z12.31 SCREENING MAMMOGRAM FOR BREAST CANCER: ICD-10-CM

## 2024-07-18 PROCEDURE — 77063 BREAST TOMOSYNTHESIS BI: CPT

## 2024-07-22 ENCOUNTER — PATIENT MESSAGE (OUTPATIENT)
Dept: OBGYN CLINIC | Age: 54
End: 2024-07-22

## 2024-07-22 LAB — CYTOLOGY REPORT: NORMAL

## 2024-07-22 NOTE — TELEPHONE ENCOUNTER
From: Ananth Graham  To: Roxane Solis  Sent: 7/22/2024 11:29 AM EDT  Subject: Scripts    Good Morning Roxane,  I think I would like to bump up the estradiol patch a level on my next refill. Also, my pharmacy is closing and I am signing up with [facility] online. Their site is tell me I need to give you their pharmacy NCPDP ID #[id number]. Is it correct to send this to you ?  Thanks so much,  Ananth

## 2024-07-25 RX ORDER — ESTRADIOL 0.05 MG/D
1 PATCH TRANSDERMAL WEEKLY
Qty: 4 PATCH | Refills: 1 | Status: SHIPPED | OUTPATIENT
Start: 2024-07-25

## 2024-07-26 ENCOUNTER — NURSE ONLY (OUTPATIENT)
Dept: PODIATRY | Age: 54
End: 2024-07-26

## 2024-07-26 RX ORDER — PROGESTERONE 100 MG/1
100 CAPSULE ORAL DAILY
Qty: 30 CAPSULE | Refills: 5 | OUTPATIENT
Start: 2024-07-26

## 2024-09-21 DIAGNOSIS — E34.9 HORMONE IMBALANCE: ICD-10-CM

## 2024-09-21 DIAGNOSIS — Z78.0 POSTMENOPAUSAL: ICD-10-CM

## 2024-09-21 DIAGNOSIS — R23.2 HOT FLASHES: Primary | ICD-10-CM

## 2024-09-23 ENCOUNTER — TELEPHONE (OUTPATIENT)
Dept: OBGYN CLINIC | Age: 54
End: 2024-09-23

## 2024-09-23 DIAGNOSIS — F32.A ANXIETY AND DEPRESSION: ICD-10-CM

## 2024-09-23 DIAGNOSIS — F41.9 ANXIETY AND DEPRESSION: ICD-10-CM

## 2024-09-23 RX ORDER — ESTRADIOL 0.05 MG/D
1 PATCH TRANSDERMAL WEEKLY
Qty: 4 PATCH | Refills: 1 | Status: SHIPPED | OUTPATIENT
Start: 2024-09-23

## 2024-09-24 RX ORDER — ESCITALOPRAM OXALATE 10 MG/1
10 TABLET ORAL DAILY
Qty: 90 TABLET | Refills: 1 | Status: SHIPPED | OUTPATIENT
Start: 2024-09-24

## 2024-10-29 ENCOUNTER — TELEMEDICINE (OUTPATIENT)
Dept: OBGYN CLINIC | Age: 54
End: 2024-10-29
Payer: COMMERCIAL

## 2024-10-29 DIAGNOSIS — Z78.0 POSTMENOPAUSAL: ICD-10-CM

## 2024-10-29 DIAGNOSIS — R23.2 HOT FLASHES: ICD-10-CM

## 2024-10-29 DIAGNOSIS — E34.9 HORMONE IMBALANCE: ICD-10-CM

## 2024-10-29 PROCEDURE — 99214 OFFICE O/P EST MOD 30 MIN: CPT | Performed by: NURSE PRACTITIONER

## 2024-10-29 RX ORDER — ESTRADIOL 0.05 MG/D
1 PATCH TRANSDERMAL WEEKLY
Qty: 12 PATCH | Refills: 1 | Status: SHIPPED | OUTPATIENT
Start: 2024-10-29

## 2024-10-29 RX ORDER — PROGESTERONE 100 MG/1
100 CAPSULE ORAL DAILY
Qty: 90 CAPSULE | Refills: 1 | Status: SHIPPED | OUTPATIENT
Start: 2024-10-29

## 2024-10-29 ASSESSMENT — ENCOUNTER SYMPTOMS
WHEEZING: 0
SHORTNESS OF BREATH: 0
VOMITING: 0
COLOR CHANGE: 0
NAUSEA: 0

## 2024-10-29 NOTE — PROGRESS NOTES
Ananth Graham, was evaluated through a synchronous (real-time) audio-video encounter. The patient (or guardian if applicable) is aware that this is a billable service, which includes applicable co-pays. This Virtual Visit was conducted with patient's (and/or legal guardian's) consent. Patient identification was verified, and a caregiver was present when appropriate.   The patient was located at Home: [address] [place] [id number]  Provider was located at Facility (Appt Dept): [address] [place] [address]  Confirm you are appropriately licensed, registered, or certified to deliver care in the [facility] where the patient is located as indicated above. If you are not or unsure, please re-schedule the visit: Yes, I confirm.     Ananth Graham (:  1970) is a Established patient, presenting virtually for evaluation of the following:      Below is the assessment and plan developed based on review of pertinent history, physical exam, labs, studies, and medications.     Assessment & Plan  Hot flashes   Orders:    estradiol (CLIMARA) 0.05 MG/24HR; Place 1 patch onto the skin once a week    progesterone (PROMETRIUM) 100 MG CAPS capsule; Take 1 capsule by mouth daily    Postmenopausal       Orders:    estradiol (CLIMARA) 0.05 MG/24HR; Place 1 patch onto the skin once a week    progesterone (PROMETRIUM) 100 MG CAPS capsule; Take 1 capsule by mouth daily    Hormone imbalance       Orders:    estradiol (CLIMARA) 0.05 MG/24HR; Place 1 patch onto the skin once a week    progesterone (PROMETRIUM) 100 MG CAPS capsule; Take 1 capsule by mouth daily      Her hot flashes have improved and resolved with hormone replacement patch she is taking oral progesterone daily with this also.  She did have episode of vaginal bleeding spotting in July when started on increased dose of patch she states has not had any return of bleeding since.  BP WNL     HRT usage ( uterine bleeding can also occur in the early

## 2025-01-13 ENCOUNTER — OFFICE VISIT (OUTPATIENT)
Dept: FAMILY MEDICINE CLINIC | Age: 55
End: 2025-01-13
Payer: COMMERCIAL

## 2025-01-13 VITALS
WEIGHT: 148 LBS | TEMPERATURE: 98.1 F | HEART RATE: 89 BPM | SYSTOLIC BLOOD PRESSURE: 100 MMHG | BODY MASS INDEX: 22.5 KG/M2 | DIASTOLIC BLOOD PRESSURE: 72 MMHG | RESPIRATION RATE: 16 BRPM | OXYGEN SATURATION: 98 %

## 2025-01-13 DIAGNOSIS — Z00.00 PREVENTATIVE HEALTH CARE: ICD-10-CM

## 2025-01-13 DIAGNOSIS — N95.1 PERIMENOPAUSAL: ICD-10-CM

## 2025-01-13 DIAGNOSIS — F32.A ANXIETY AND DEPRESSION: ICD-10-CM

## 2025-01-13 DIAGNOSIS — F51.04 PSYCHOPHYSIOLOGICAL INSOMNIA: ICD-10-CM

## 2025-01-13 DIAGNOSIS — G89.29 CHRONIC MIDLINE LOW BACK PAIN, UNSPECIFIED WHETHER SCIATICA PRESENT: ICD-10-CM

## 2025-01-13 DIAGNOSIS — F41.9 ANXIETY AND DEPRESSION: ICD-10-CM

## 2025-01-13 DIAGNOSIS — M25.50 ARTHRALGIA, UNSPECIFIED JOINT: ICD-10-CM

## 2025-01-13 DIAGNOSIS — M54.50 CHRONIC MIDLINE LOW BACK PAIN, UNSPECIFIED WHETHER SCIATICA PRESENT: ICD-10-CM

## 2025-01-13 DIAGNOSIS — E55.9 VITAMIN D DEFICIENCY: Primary | ICD-10-CM

## 2025-01-13 DIAGNOSIS — E06.3 HASHIMOTO'S DISEASE: ICD-10-CM

## 2025-01-13 PROCEDURE — 99214 OFFICE O/P EST MOD 30 MIN: CPT | Performed by: NURSE PRACTITIONER

## 2025-01-13 SDOH — ECONOMIC STABILITY: FOOD INSECURITY: WITHIN THE PAST 12 MONTHS, YOU WORRIED THAT YOUR FOOD WOULD RUN OUT BEFORE YOU GOT MONEY TO BUY MORE.: NEVER TRUE

## 2025-01-13 SDOH — ECONOMIC STABILITY: FOOD INSECURITY: WITHIN THE PAST 12 MONTHS, THE FOOD YOU BOUGHT JUST DIDN'T LAST AND YOU DIDN'T HAVE MONEY TO GET MORE.: NEVER TRUE

## 2025-01-13 ASSESSMENT — ENCOUNTER SYMPTOMS
DIARRHEA: 0
BACK PAIN: 0
CONSTIPATION: 0
COLOR CHANGE: 0
ABDOMINAL PAIN: 0
NAUSEA: 0
CHEST TIGHTNESS: 0
RHINORRHEA: 0
ABDOMINAL DISTENTION: 0
SHORTNESS OF BREATH: 0
SORE THROAT: 0
COUGH: 0

## 2025-01-13 ASSESSMENT — PATIENT HEALTH QUESTIONNAIRE - PHQ9
8. MOVING OR SPEAKING SO SLOWLY THAT OTHER PEOPLE COULD HAVE NOTICED. OR THE OPPOSITE, BEING SO FIGETY OR RESTLESS THAT YOU HAVE BEEN MOVING AROUND A LOT MORE THAN USUAL: NOT AT ALL
2. FEELING DOWN, DEPRESSED OR HOPELESS: NOT AT ALL
7. TROUBLE CONCENTRATING ON THINGS, SUCH AS READING THE NEWSPAPER OR WATCHING TELEVISION: NOT AT ALL
SUM OF ALL RESPONSES TO PHQ QUESTIONS 1-9: 0
6. FEELING BAD ABOUT YOURSELF - OR THAT YOU ARE A FAILURE OR HAVE LET YOURSELF OR YOUR FAMILY DOWN: NOT AT ALL
SUM OF ALL RESPONSES TO PHQ QUESTIONS 1-9: 0
9. THOUGHTS THAT YOU WOULD BE BETTER OFF DEAD, OR OF HURTING YOURSELF: NOT AT ALL
SUM OF ALL RESPONSES TO PHQ9 QUESTIONS 1 & 2: 0
4. FEELING TIRED OR HAVING LITTLE ENERGY: NOT AT ALL
1. LITTLE INTEREST OR PLEASURE IN DOING THINGS: NOT AT ALL
SUM OF ALL RESPONSES TO PHQ QUESTIONS 1-9: 0
10. IF YOU CHECKED OFF ANY PROBLEMS, HOW DIFFICULT HAVE THESE PROBLEMS MADE IT FOR YOU TO DO YOUR WORK, TAKE CARE OF THINGS AT HOME, OR GET ALONG WITH OTHER PEOPLE: NOT DIFFICULT AT ALL
SUM OF ALL RESPONSES TO PHQ QUESTIONS 1-9: 0
5. POOR APPETITE OR OVEREATING: NOT AT ALL
3. TROUBLE FALLING OR STAYING ASLEEP: NOT AT ALL

## 2025-01-13 NOTE — PATIENT INSTRUCTIONS
- Get labs done a few days before next appointment. You will want to be fasting which means nothing to eat or drink for 12 hours before completing labs.     - Flonase 2 sprays each nostril at night  - Claritin or zyrtec daily

## 2025-03-18 ENCOUNTER — RESULTS FOLLOW-UP (OUTPATIENT)
Dept: FAMILY MEDICINE CLINIC | Age: 55
End: 2025-03-18

## 2025-03-18 DIAGNOSIS — R23.2 HOT FLASHES: ICD-10-CM

## 2025-03-18 DIAGNOSIS — F41.9 ANXIETY AND DEPRESSION: ICD-10-CM

## 2025-03-18 DIAGNOSIS — Z78.0 POSTMENOPAUSAL: ICD-10-CM

## 2025-03-18 DIAGNOSIS — E55.9 VITAMIN D DEFICIENCY: ICD-10-CM

## 2025-03-18 DIAGNOSIS — E34.9 HORMONE IMBALANCE: ICD-10-CM

## 2025-03-18 DIAGNOSIS — E06.3 HASHIMOTO'S DISEASE: ICD-10-CM

## 2025-03-18 DIAGNOSIS — F32.A ANXIETY AND DEPRESSION: ICD-10-CM

## 2025-03-18 DIAGNOSIS — Z00.00 PREVENTATIVE HEALTH CARE: ICD-10-CM

## 2025-03-18 LAB
ALBUMIN: 4.4 G/DL
ALP BLD-CCNC: 67 U/L
ALT SERPL-CCNC: 10 U/L
ANION GAP SERPL CALCULATED.3IONS-SCNC: 10 MMOL/L
AST SERPL-CCNC: 15 U/L
BASOPHILS ABSOLUTE: NORMAL
BASOPHILS RELATIVE PERCENT: NORMAL
BILIRUB SERPL-MCNC: 0.6 MG/DL (ref 0.1–1.4)
BUN BLDV-MCNC: 20 MG/DL
CALCIUM SERPL-MCNC: 9.2 MG/DL
CHLORIDE BLD-SCNC: 102 MMOL/L
CHOLESTEROL, TOTAL: 209 MG/DL
CHOLESTEROL/HDL RATIO: 3.5
CO2: 26 MMOL/L
CREAT SERPL-MCNC: 0.85 MG/DL
EOSINOPHILS ABSOLUTE: NORMAL
EOSINOPHILS RELATIVE PERCENT: NORMAL
ESTIMATED AVERAGE GLUCOSE: 105
GFR, ESTIMATED: 82
GLUCOSE BLD-MCNC: 84 MG/DL
HBA1C MFR BLD: 5.3 %
HCT VFR BLD CALC: 41.2 % (ref 36–46)
HDLC SERPL-MCNC: 59 MG/DL (ref 35–70)
HEMOGLOBIN: 14.2 G/DL (ref 12–16)
LDL CHOLESTEROL: 136
LYMPHOCYTES ABSOLUTE: NORMAL
LYMPHOCYTES RELATIVE PERCENT: NORMAL
MCH RBC QN AUTO: NORMAL PG
MCHC RBC AUTO-ENTMCNC: NORMAL G/DL
MCV RBC AUTO: NORMAL FL
MONOCYTES ABSOLUTE: NORMAL
MONOCYTES RELATIVE PERCENT: NORMAL
NEUTROPHILS ABSOLUTE: NORMAL
NEUTROPHILS RELATIVE PERCENT: NORMAL
NONHDLC SERPL-MCNC: NORMAL MG/DL
PLATELET # BLD: 206 K/ΜL
PMV BLD AUTO: NORMAL FL
POTASSIUM SERPL-SCNC: 4 MMOL/L
RBC # BLD: NORMAL 10*6/UL
SODIUM BLD-SCNC: 138 MMOL/L
T4 FREE: 1
TOTAL PROTEIN: 7 G/DL (ref 6.4–8.2)
TRIGL SERPL-MCNC: 69 MG/DL
TSH SERPL DL<=0.05 MIU/L-ACNC: 2.76 UIU/ML
VITAMIN D 25-HYDROXY: 48.8
VITAMIN D2, 25 HYDROXY: NORMAL
VITAMIN D3,25 HYDROXY: NORMAL
VLDLC SERPL CALC-MCNC: 14 MG/DL
WBC # BLD: 4.3 10^3/ML

## 2025-03-18 RX ORDER — ESCITALOPRAM OXALATE 10 MG/1
10 TABLET ORAL DAILY
Qty: 90 TABLET | Refills: 1 | Status: SHIPPED | OUTPATIENT
Start: 2025-03-18

## 2025-03-19 RX ORDER — ESTRADIOL 0.05 MG/D
1 PATCH TRANSDERMAL WEEKLY
Qty: 12 PATCH | Refills: 0 | Status: SHIPPED | OUTPATIENT
Start: 2025-03-19

## 2025-03-19 RX ORDER — PROGESTERONE 100 MG/1
100 CAPSULE ORAL DAILY
Qty: 90 CAPSULE | Refills: 0 | Status: SHIPPED | OUTPATIENT
Start: 2025-03-19

## 2025-03-19 NOTE — TELEPHONE ENCOUNTER
Please call pt to schedule annual exam she will be due anytime after 7/15/25 I refilled script for HRT but recommend she be scheduled for annual so no lapse in prescription.

## 2025-04-21 ENCOUNTER — OFFICE VISIT (OUTPATIENT)
Dept: PODIATRY | Age: 55
End: 2025-04-21
Payer: COMMERCIAL

## 2025-04-21 VITALS — BODY MASS INDEX: 21.22 KG/M2 | WEIGHT: 140 LBS | HEIGHT: 68 IN

## 2025-04-21 DIAGNOSIS — M76.822 POSTERIOR TIBIAL TENDON DYSFUNCTION (PTTD) OF LEFT LOWER EXTREMITY: Primary | ICD-10-CM

## 2025-04-21 DIAGNOSIS — Q66.6 CONGENITAL PES PLANOVALGUS: ICD-10-CM

## 2025-04-21 DIAGNOSIS — Q66.6 CONGENITAL REARFOOT VALGUS: ICD-10-CM

## 2025-04-21 DIAGNOSIS — M79.672 PAIN IN LEFT FOOT: ICD-10-CM

## 2025-04-21 DIAGNOSIS — M76.822 POSTERIOR TIBIAL TENDINITIS OF LEFT LOWER EXTREMITY: ICD-10-CM

## 2025-04-21 PROCEDURE — 99213 OFFICE O/P EST LOW 20 MIN: CPT | Performed by: PODIATRIST

## 2025-04-21 NOTE — PROGRESS NOTES
[facility] Podiatry  Return Patient Progress Note    Subjective: Ananth Graham 54 y.o. female that presents with chief complaint of continued pain to the left foot and ankle.  Chief Complaint   Patient presents with    Foot Pain     B/l foot pain   Wants orthotics looked at    Patient's treatment thus far has included custom orthotics.  Patient states that her orthotics are causing her pain in her knee now.  Pain is rated 9 out of 10 and is described as intermittent. Patient has been following my prescribed course of therapy as instructed.     Review of Systems   Constitutional:  Negative for activity change, appetite change, chills, diaphoresis, fatigue and fever.   Respiratory:  Negative for shortness of breath.    Cardiovascular:  Negative for leg swelling.   Gastrointestinal:  Negative for diarrhea and nausea.   Endocrine: Negative for cold intolerance, heat intolerance and polyuria.   Musculoskeletal:  Positive for arthralgias. Negative for back pain, gait problem, joint swelling and myalgias.   Skin:  Negative for color change, pallor, rash and wound.   Allergic/Immunologic: Negative for environmental allergies and food allergies.   Neurological:  Negative for dizziness, weakness, light-headedness and numbness.   Hematological:  Does not bruise/bleed easily.   Psychiatric/Behavioral:  Negative for behavioral problems, confusion and self-injury. The patient is not nervous/anxious.        Objective: Clinical evaluation of the patient reveals rearfoot eversion, medial prominence of the talar head, loss of the medial longitudinal arch height, soft tissue impingement to the lateral aspect of the foot, and too many toes sign to the left foot, but not to the right upon weightbearing.  These findings are present with the patient seated as well.  The rear foot deformity is reducible to the left foot.  However, when doing so there is a forefoot valgus noted.  Declination of the first metatarsal is possible.

## 2025-04-22 ASSESSMENT — ENCOUNTER SYMPTOMS
BACK PAIN: 0
NAUSEA: 0
COLOR CHANGE: 0
SHORTNESS OF BREATH: 0
DIARRHEA: 0

## 2025-06-11 ENCOUNTER — OFFICE VISIT (OUTPATIENT)
Dept: PODIATRY | Age: 55
End: 2025-06-11
Payer: COMMERCIAL

## 2025-06-11 VITALS — BODY MASS INDEX: 21.22 KG/M2 | WEIGHT: 140 LBS | HEIGHT: 68 IN

## 2025-06-11 DIAGNOSIS — M79.605 PAIN OF LEFT LOWER EXTREMITY: ICD-10-CM

## 2025-06-11 DIAGNOSIS — M25.872 IMPINGEMENT SYNDROME OF LEFT ANKLE: ICD-10-CM

## 2025-06-11 DIAGNOSIS — Q66.6 CONGENITAL PES PLANOVALGUS: ICD-10-CM

## 2025-06-11 DIAGNOSIS — Q66.6 CONGENITAL REARFOOT VALGUS: ICD-10-CM

## 2025-06-11 DIAGNOSIS — Q66.89 TARSAL COALITION OF LEFT FOOT: Primary | ICD-10-CM

## 2025-06-11 PROCEDURE — 99214 OFFICE O/P EST MOD 30 MIN: CPT | Performed by: PODIATRIST

## 2025-06-11 NOTE — PROGRESS NOTES
[facility] Podiatry  New Patient History and Physical    Chief Complaint:   Chief Complaint   Patient presents with    Foot Pain     Left foot, surgery consult       HPI: Ananth Graham is a 54 y.o. female who presents to the office today complaining of left foot and ankle pain. Has had a flat foot since she was a kid. Has been able to function on foot with little pain but now getting more painful over the past 2 years or so. Having some left knee pain also. Pain mainly lateral foot and ankle, no swelling, no injury. Has seen 2 other podiatrists before. Had 2 injections in the lateral ankle, did not help much. Had custom orthotics, caused more knee pain. Wears good shoes. Used to like to run, she is a teacher, very active. Worried that her foot is going to get worse and harder to treat as she gets older.     Has had xrays in the past.   Has had an MRI. 7/23  IMPRESSION:  1. Medial talocalcaneal osseous tarsal coalition.  2. Mild plantar calcaneal spur.  Mild plantar fasciitis.  No plantar fascial  tear.  3. Pes planus.  4. Degenerative subcortical cystic changes and marrow edema at the distal  fibula.  5. Age-indeterminate partial ATFL tear.  Age-indeterminate partial-thickness  interstitial medial deltoid ligament complex tear.  Small tibiotalar effusion.  6. Effacement of the sinus tarsi which can be seen with sinus tarsi  syndrome/ankle instability in the appropriate clinical setting.  7. Mild diffuse degenerative changes as above.  8. Mild edema in the subcutaneous fat about the ankle.       Currently denies F/C/N/V.     No Known Allergies    Past Medical History:   Diagnosis Date    Headache     Hypothyroidism     Restless legs syndrome        Prior to Admission medications    Medication Sig Start Date End Date Taking? Authorizing Provider   estradiol (CLIMARA) 0.05 MG/24HR Place 1 patch onto the skin once a week 3/19/25   Roxane Solis APRN - CNP   progesterone (PROMETRIUM) 100 MG CAPS capsule

## 2025-06-19 ENCOUNTER — HOSPITAL ENCOUNTER (OUTPATIENT)
Dept: CT IMAGING | Age: 55
Discharge: HOME | End: 2025-06-21
Attending: PODIATRIST
Payer: COMMERCIAL

## 2025-06-19 DIAGNOSIS — Q66.6 CONGENITAL REARFOOT VALGUS: ICD-10-CM

## 2025-06-19 DIAGNOSIS — M25.872 IMPINGEMENT SYNDROME OF LEFT ANKLE: ICD-10-CM

## 2025-06-19 DIAGNOSIS — M79.605 PAIN OF LEFT LOWER EXTREMITY: ICD-10-CM

## 2025-06-19 DIAGNOSIS — Q66.6 CONGENITAL PES PLANOVALGUS: ICD-10-CM

## 2025-06-19 DIAGNOSIS — Q66.89 TARSAL COALITION OF LEFT FOOT: ICD-10-CM

## 2025-06-19 PROCEDURE — 73700 CT LOWER EXTREMITY W/O DYE: CPT

## 2025-07-03 ENCOUNTER — OFFICE VISIT (OUTPATIENT)
Dept: PODIATRY | Age: 55
End: 2025-07-03
Payer: COMMERCIAL

## 2025-07-03 ENCOUNTER — PREP FOR PROCEDURE (OUTPATIENT)
Dept: PODIATRY | Age: 55
End: 2025-07-03

## 2025-07-03 VITALS — BODY MASS INDEX: 21.22 KG/M2 | HEIGHT: 68 IN | WEIGHT: 140 LBS

## 2025-07-03 DIAGNOSIS — M25.872 IMPINGEMENT SYNDROME OF LEFT ANKLE: ICD-10-CM

## 2025-07-03 DIAGNOSIS — Q66.6 CONGENITAL REARFOOT VALGUS: ICD-10-CM

## 2025-07-03 DIAGNOSIS — M21.42 ACQUIRED FLAT FOOT, LEFT: ICD-10-CM

## 2025-07-03 DIAGNOSIS — M76.822 POSTERIOR TIBIAL TENDON DYSFUNCTION (PTTD) OF LEFT LOWER EXTREMITY: ICD-10-CM

## 2025-07-03 DIAGNOSIS — Q66.89 TARSAL COALITION OF LEFT FOOT: ICD-10-CM

## 2025-07-03 DIAGNOSIS — M79.605 PAIN OF LEFT LOWER EXTREMITY: ICD-10-CM

## 2025-07-03 DIAGNOSIS — Q66.89 TARSAL COALITION OF LEFT FOOT: Primary | ICD-10-CM

## 2025-07-03 DIAGNOSIS — Q66.6 CONGENITAL PES PLANOVALGUS: ICD-10-CM

## 2025-07-03 PROCEDURE — 99214 OFFICE O/P EST MOD 30 MIN: CPT | Performed by: PODIATRIST

## 2025-07-03 NOTE — H&P (VIEW-ONLY)
[facility] Podiatry  Return Patient History and Physical    Chief Complaint:   Chief Complaint   Patient presents with    Results     CT results    Foot Pain     Left foot        HPI: Ananth Graham is a 55 y.o. female who presents to the office today complaining of left foot and ankle pain. Follow up CT scan. She is here with her .       Has had a flat foot since she was a kid. Has been able to function on foot with little pain but now getting more painful over the past 2 years or so. Having some left knee pain also. Pain mainly lateral foot and ankle, no swelling, no injury. Has seen 2 other podiatrists before. Had 2 injections in the lateral ankle, did not help much. Had custom orthotics, caused more knee pain. Wears good shoes. Used to like to run, she is a teacher, very active. Worried that her foot is going to get worse and harder to treat as she gets older.     Has had xrays in the past.   Has had an MRI. 7/23  IMPRESSION:  1. Medial talocalcaneal osseous tarsal coalition.  2. Mild plantar calcaneal spur.  Mild plantar fasciitis.  No plantar fascial  tear.  3. Pes planus.  4. Degenerative subcortical cystic changes and marrow edema at the distal  fibula.  5. Age-indeterminate partial ATFL tear.  Age-indeterminate partial-thickness  interstitial medial deltoid ligament complex tear.  Small tibiotalar effusion.  6. Effacement of the sinus tarsi which can be seen with sinus tarsi  syndrome/ankle instability in the appropriate clinical setting.  7. Mild diffuse degenerative changes as above.  8. Mild edema in the subcutaneous fat about the ankle.       Currently denies F/C/N/V.     No Known Allergies    Past Medical History:   Diagnosis Date    Headache     Hypothyroidism     Restless legs syndrome        Prior to Admission medications    Medication Sig Start Date End Date Taking? Authorizing Provider   estradiol (CLIMARA) 0.05 MG/24HR Place 1 patch onto the skin once a week 3/19/25   Irma

## 2025-07-03 NOTE — PROGRESS NOTES
[facility] Podiatry  Return Patient History and Physical    Chief Complaint:   Chief Complaint   Patient presents with    Results     CT results    Foot Pain     Left foot        HPI: Ananth Graham is a 55 y.o. female who presents to the office today complaining of left foot and ankle pain. Follow up CT scan. She is here with her .       Has had a flat foot since she was a kid. Has been able to function on foot with little pain but now getting more painful over the past 2 years or so. Having some left knee pain also. Pain mainly lateral foot and ankle, no swelling, no injury. Has seen 2 other podiatrists before. Had 2 injections in the lateral ankle, did not help much. Had custom orthotics, caused more knee pain. Wears good shoes. Used to like to run, she is a teacher, very active. Worried that her foot is going to get worse and harder to treat as she gets older.     Has had xrays in the past.   Has had an MRI. 7/23  IMPRESSION:  1. Medial talocalcaneal osseous tarsal coalition.  2. Mild plantar calcaneal spur.  Mild plantar fasciitis.  No plantar fascial  tear.  3. Pes planus.  4. Degenerative subcortical cystic changes and marrow edema at the distal  fibula.  5. Age-indeterminate partial ATFL tear.  Age-indeterminate partial-thickness  interstitial medial deltoid ligament complex tear.  Small tibiotalar effusion.  6. Effacement of the sinus tarsi which can be seen with sinus tarsi  syndrome/ankle instability in the appropriate clinical setting.  7. Mild diffuse degenerative changes as above.  8. Mild edema in the subcutaneous fat about the ankle.       Currently denies F/C/N/V.     No Known Allergies    Past Medical History:   Diagnosis Date    Headache     Hypothyroidism     Restless legs syndrome        Prior to Admission medications    Medication Sig Start Date End Date Taking? Authorizing Provider   estradiol (CLIMARA) 0.05 MG/24HR Place 1 patch onto the skin once a week 3/19/25   Irma

## 2025-07-09 NOTE — PROGRESS NOTES
insomnia        8. Elevated cholesterol  Lipid Panel      9. Screening for diabetes mellitus  Hemoglobin A1C        Plan:     Vitamin D deficiency  - Stable: Medication re-filled as needed, con't medications as prescribed, con't current tx plan  - Please increase foods with Vitamin D, which include cheese, eggs, cereals, yogurt, milk, tofu, any type of beef, salmon or tuna,  spinach, and mushroom.   - continue to follow up with Dr. James.     Hashimoto's disease  - Stable: Medication re-filled as needed, con't medications as prescribed, con't current tx plan  - Continue with synthroid as previously prescribed.   - continue to follow up with endocrinology as recommended    Perimenopausal  Anxiety and depression  Psychophysiological insomnia  - continue lexapro as ordered.  - continue estradiol and progesterone as prescribed by OB/GYN.     - We did discuss the recommended preventative screening guidelines including routine dental visits and eye exam.  (HTN, lipid, DM, breast, cervical cancer, colorectal and osteoporosis).    - Detailed education was provided on the patient's after visit summary.  - Will order above noted labs and discuss them at follow up visit.  - Will cont to follow with  ob/gyn as instructed for routine PAP.  - Annual mammograms as recommended.    The patient (or guardian, if applicable) and other individuals in attendance with the patient were advised that Artificial Intelligence will be utilized during this visit to record, process the conversation to generate a clinical note, and support improvement of the AI technology. The patient (or guardian, if applicable) and other individuals in attendance at the appointment consented to the use of AI, including the recording.      - pt verbalized understanding plan of care.     Medications, labs, diagnostic studies, consultations and follow-up as documented in this encounter. Rest of systems unchanged, continue current treatments.    On this date

## 2025-07-11 ENCOUNTER — HOSPITAL ENCOUNTER (OUTPATIENT)
Dept: PREADMISSION TESTING | Age: 55
Discharge: HOME | End: 2025-07-15
Payer: COMMERCIAL

## 2025-07-11 VITALS
HEART RATE: 62 BPM | BODY MASS INDEX: 22.73 KG/M2 | DIASTOLIC BLOOD PRESSURE: 64 MMHG | OXYGEN SATURATION: 97 % | WEIGHT: 150 LBS | TEMPERATURE: 97.7 F | SYSTOLIC BLOOD PRESSURE: 105 MMHG | RESPIRATION RATE: 16 BRPM | HEIGHT: 68 IN

## 2025-07-11 LAB
ANION GAP SERPL CALCULATED.3IONS-SCNC: 10 MMOL/L (ref 9–16)
BASOPHILS # BLD: 0.03 K/UL (ref 0–0.2)
BASOPHILS NFR BLD: 1 % (ref 0–2)
BUN SERPL-MCNC: 21 MG/DL (ref 6–20)
CALCIUM SERPL-MCNC: 9.3 MG/DL (ref 8.6–10.4)
CHLORIDE SERPL-SCNC: 103 MMOL/L (ref 98–107)
CO2 SERPL-SCNC: 24 MMOL/L (ref 20–31)
CREAT SERPL-MCNC: 0.9 MG/DL (ref 0.7–1.2)
EOSINOPHIL # BLD: 0.1 K/UL (ref 0–0.44)
EOSINOPHILS RELATIVE PERCENT: 2 % (ref 0–4)
ERYTHROCYTE [DISTWIDTH] IN BLOOD BY AUTOMATED COUNT: 12 % (ref 11.5–14.9)
GFR, ESTIMATED: 76 ML/MIN/1.73M2
GLUCOSE SERPL-MCNC: 86 MG/DL (ref 74–99)
HCT VFR BLD AUTO: 38.7 % (ref 36–46)
HGB BLD-MCNC: 12.7 G/DL (ref 12–16)
IMM GRANULOCYTES # BLD AUTO: <0.03 K/UL (ref 0–0.3)
IMM GRANULOCYTES NFR BLD: 0 %
LYMPHOCYTES NFR BLD: 1.73 K/UL (ref 1.1–3.7)
LYMPHOCYTES RELATIVE PERCENT: 33 % (ref 24–44)
MCH RBC QN AUTO: 30.9 PG (ref 26–34)
MCHC RBC AUTO-ENTMCNC: 32.8 G/DL (ref 31–37)
MCV RBC AUTO: 94.2 FL (ref 80–100)
MONOCYTES NFR BLD: 0.34 K/UL (ref 0.1–1.2)
MONOCYTES NFR BLD: 7 % (ref 3–12)
NEUTROPHILS NFR BLD: 57 % (ref 36–66)
NEUTS SEG NFR BLD: 3.04 K/UL (ref 1.5–8.1)
NRBC BLD-RTO: 0 PER 100 WBC
PLATELET # BLD AUTO: 197 K/UL (ref 150–450)
PMV BLD AUTO: 12.1 FL (ref 8–13.5)
POTASSIUM SERPL-SCNC: 4.1 MMOL/L (ref 3.7–5.3)
RBC # BLD AUTO: 4.11 M/UL (ref 3.95–5.11)
SODIUM SERPL-SCNC: 137 MMOL/L (ref 136–145)
WBC OTHER # BLD: 5.3 K/UL (ref 3.5–11)

## 2025-07-11 PROCEDURE — 36415 COLL VENOUS BLD VENIPUNCTURE: CPT

## 2025-07-11 PROCEDURE — 93005 ELECTROCARDIOGRAM TRACING: CPT | Performed by: ANESTHESIOLOGY

## 2025-07-11 PROCEDURE — 85025 COMPLETE CBC W/AUTO DIFF WBC: CPT

## 2025-07-11 PROCEDURE — 80048 BASIC METABOLIC PNL TOTAL CA: CPT

## 2025-07-11 NOTE — DISCHARGE INSTRUCTIONS
Pre-op Instructions For Out-Patient Surgery    Medication Instructions:  Please stop herbs and any supplements now (includes vitamins and minerals).    For these medications:  Dulaglutide (Trulicity), Exenatide (Byetta and Bydureon, Liraglutide (Victoza), Lixisenatide (Adlyxin), Semaglutide (Ozempic and Rybelsus), Tirzepatide (Mounjaro, Zepbound, Wegovy)- Stop 1 week prior if taking weekly or 1 day prior if taking every 12 hours or daily.     Please contact your surgeon and prescribing physician for pre-op instructions for any blood thinners.    If you have inhalers/aerosol treatments at home, please use them the morning of your surgery and bring the inhalers with you to the hospital.    Please take the following medications the morning of your surgery with a sip of water:    Levothyroxine    Surgery Instructions:  After midnight before surgery:  Do not eat or drink anything, including water, mints, gum, and hard candy.  You may brush your teeth without swallowing.  No smoking, chewing tobacco, or street drugs.    Please shower or bathe before surgery.  If you were given Surgical Scrub Chlorhexidine Gluconate Liquid (CHG), please shower the night before and the morning of your surgery following the detailed instructions you received during your pre-admission visit.     Please do not wear any cologne, lotion, powder, deodorant, jewelry, piercings, perfume, makeup, nail polish, hair accessories, or hair spray on the day of surgery.  Wear loose comfortable clothing.    Leave your valuables at home but bring a payment source for any after-surgery prescriptions you plan to fill at [facility].  Bring a storage case for any glasses/contacts.    An adult who is responsible for you MUST remain the in the hospital and drive you home and should be with you for the first 24 hours after surgery.     If having out-patient knee and foot surgeries, please arrange for planned crutches, walker,

## 2025-07-12 LAB
EKG ATRIAL RATE: 60 BPM
EKG P AXIS: 66 DEGREES
EKG P-R INTERVAL: 132 MS
EKG Q-T INTERVAL: 430 MS
EKG QRS DURATION: 80 MS
EKG QTC CALCULATION (BAZETT): 430 MS
EKG R AXIS: 65 DEGREES
EKG T AXIS: 59 DEGREES
EKG VENTRICULAR RATE: 60 BPM

## 2025-07-12 PROCEDURE — 93010 ELECTROCARDIOGRAM REPORT: CPT | Performed by: INTERNAL MEDICINE

## 2025-07-14 ENCOUNTER — OFFICE VISIT (OUTPATIENT)
Dept: FAMILY MEDICINE CLINIC | Age: 55
End: 2025-07-14
Payer: COMMERCIAL

## 2025-07-14 VITALS
OXYGEN SATURATION: 95 % | DIASTOLIC BLOOD PRESSURE: 74 MMHG | SYSTOLIC BLOOD PRESSURE: 112 MMHG | BODY MASS INDEX: 22.5 KG/M2 | WEIGHT: 148 LBS | HEART RATE: 66 BPM | RESPIRATION RATE: 16 BRPM | TEMPERATURE: 97.6 F

## 2025-07-14 DIAGNOSIS — F32.A ANXIETY AND DEPRESSION: ICD-10-CM

## 2025-07-14 DIAGNOSIS — E55.9 VITAMIN D DEFICIENCY: ICD-10-CM

## 2025-07-14 DIAGNOSIS — N95.1 PERIMENOPAUSAL: ICD-10-CM

## 2025-07-14 DIAGNOSIS — E06.3 HASHIMOTO'S DISEASE: ICD-10-CM

## 2025-07-14 DIAGNOSIS — F51.04 PSYCHOPHYSIOLOGICAL INSOMNIA: ICD-10-CM

## 2025-07-14 DIAGNOSIS — Z12.31 SCREENING MAMMOGRAM FOR BREAST CANCER: ICD-10-CM

## 2025-07-14 DIAGNOSIS — Z13.1 SCREENING FOR DIABETES MELLITUS: ICD-10-CM

## 2025-07-14 DIAGNOSIS — F41.9 ANXIETY AND DEPRESSION: ICD-10-CM

## 2025-07-14 DIAGNOSIS — E78.00 ELEVATED CHOLESTEROL: ICD-10-CM

## 2025-07-14 DIAGNOSIS — Z00.00 ANNUAL PHYSICAL EXAM: Primary | ICD-10-CM

## 2025-07-14 PROCEDURE — 99396 PREV VISIT EST AGE 40-64: CPT | Performed by: NURSE PRACTITIONER

## 2025-07-15 ENCOUNTER — OFFICE VISIT (OUTPATIENT)
Dept: OBGYN CLINIC | Age: 55
End: 2025-07-15
Payer: COMMERCIAL

## 2025-07-15 ENCOUNTER — HOSPITAL ENCOUNTER (OUTPATIENT)
Age: 55
Setting detail: SPECIMEN
Discharge: HOME | End: 2025-07-15

## 2025-07-15 VITALS
DIASTOLIC BLOOD PRESSURE: 74 MMHG | WEIGHT: 149.2 LBS | BODY MASS INDEX: 22.61 KG/M2 | SYSTOLIC BLOOD PRESSURE: 106 MMHG | HEIGHT: 68 IN

## 2025-07-15 DIAGNOSIS — Z01.419 WELL WOMAN EXAM WITH ROUTINE GYNECOLOGICAL EXAM: Primary | ICD-10-CM

## 2025-07-15 DIAGNOSIS — R23.2 HOT FLASHES: ICD-10-CM

## 2025-07-15 DIAGNOSIS — Z78.0 POSTMENOPAUSAL: ICD-10-CM

## 2025-07-15 DIAGNOSIS — E34.9 HORMONE IMBALANCE: ICD-10-CM

## 2025-07-15 PROCEDURE — 99396 PREV VISIT EST AGE 40-64: CPT | Performed by: NURSE PRACTITIONER

## 2025-07-15 RX ORDER — ESTRADIOL 0.05 MG/D
1 PATCH TRANSDERMAL WEEKLY
Qty: 12 PATCH | Refills: 2 | Status: SHIPPED | OUTPATIENT
Start: 2025-07-15

## 2025-07-15 RX ORDER — PROGESTERONE 100 MG/1
100 CAPSULE ORAL DAILY
Qty: 90 CAPSULE | Refills: 2 | Status: SHIPPED | OUTPATIENT
Start: 2025-07-15

## 2025-07-15 ASSESSMENT — ENCOUNTER SYMPTOMS
COUGH: 0
SHORTNESS OF BREATH: 0
COLOR CHANGE: 0
VOMITING: 0
NAUSEA: 0

## 2025-07-15 NOTE — PROGRESS NOTES
Ananth Graham is a 55 y.o.  here for her annual exam.  The patient was seen and examined.The patients past medical, surgical, social and family history were reviewed.  Current medications and allergies were reviewed, and documented in the chart.      She is  with 3 children- all girls.      Tobacco abuse No     Last PAP: 2024 NILM, neg HPV hx of abnormal PAP states in her 20s possibly had LEEP  Family hx uterine or ovarian cancer-denies  Last mammogram if indicated- 2024- negative, Family hx of breast cancer -denies   Colon cancer screening if indicated-colonoscopy in 2023  , family hx colon cancer -denies           Sexually active: yes , Vaginal discharge: no,  UTI symptoms: no, voiding difficulties: no, bowels regular:Yes bloating:no        Menstrual history:  LMP: MAy 2022  She had been seen initially to establish care and for annual exam in 2023 she had complaints of heat intolerance and significant hot flashes.  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  She also was experiencing hot flashes at night and insomnia.  She had tried Veozaah first then switched to compound HRT but after couple months was not noting much improvement.  She was switched to estrogen hormone patch weekly and progesterone orally daily rubén 2024.  She states has had  improvement no longer having hot flashes has worked well.  She denies vaginal bleeding, chest pain, S.O.B., headaches, calf tenderness, DVT prevention reviewed. Risk of estrogen Breast cancer, and DVT reviewed patient aware of risk .     OB History    Para Term  AB Living   3 3 3   3   SAB IAB Ectopic Molar Multiple Live Births        3      # Outcome Date GA Lbr Shamar/2nd Weight Sex Type Anes PTL Lv   3 Term     F Vag-Spont   PAULA   2 Term     F Vag-Spont   PAULA   1 Term     F Vag-Spont   PAULA       Vitals:    07/15/25 0859   BP: 106/74   BP Site: Left Upper Arm   Patient Position: Sitting   BP Cuff Size: Medium

## 2025-07-24 ENCOUNTER — ANESTHESIA EVENT (OUTPATIENT)
Dept: OPERATING ROOM | Age: 55
End: 2025-07-24
Payer: COMMERCIAL

## 2025-07-24 NOTE — PRE-PROCEDURE INSTRUCTIONS
Nothing to eat after midnight. Y  Are you taking any blood thinners? When was the last day?N  Make sure to use Hibiclens prior to surgery.Y  Remove any jewelry and body piercings.Y  Do you wear glasses? If so, please bring a case to store them in.  Are you having any Covid symptoms?N  Do you have any new rashes, infections, etc. that we should be aware of?N  Do you have a ride home the day of surgery? It cannot be a cab or medical transportation.Y  Verify surgery time and what time to arrive at hospital.[phone]

## 2025-07-25 ENCOUNTER — APPOINTMENT (OUTPATIENT)
Dept: GENERAL RADIOLOGY | Age: 55
End: 2025-07-25
Attending: PODIATRIST
Payer: COMMERCIAL

## 2025-07-25 ENCOUNTER — ANESTHESIA (OUTPATIENT)
Dept: OPERATING ROOM | Age: 55
End: 2025-07-25
Payer: COMMERCIAL

## 2025-07-25 ENCOUNTER — HOSPITAL ENCOUNTER (OUTPATIENT)
Age: 55
Setting detail: OUTPATIENT SURGERY
Discharge: HOME | End: 2025-07-25
Attending: PODIATRIST | Admitting: PODIATRIST
Payer: COMMERCIAL

## 2025-07-25 VITALS
OXYGEN SATURATION: 95 % | WEIGHT: 150 LBS | RESPIRATION RATE: 18 BRPM | SYSTOLIC BLOOD PRESSURE: 108 MMHG | DIASTOLIC BLOOD PRESSURE: 69 MMHG | BODY MASS INDEX: 22.73 KG/M2 | HEIGHT: 68 IN | HEART RATE: 79 BPM | TEMPERATURE: 98.1 F

## 2025-07-25 DIAGNOSIS — G89.18 POST-OP PAIN: Primary | ICD-10-CM

## 2025-07-25 PROBLEM — G89.29 PAIN, FOOT, LEFT, CHRONIC: Status: ACTIVE | Noted: 2025-07-25

## 2025-07-25 PROBLEM — M25.572 CHRONIC PAIN OF LEFT ANKLE: Status: ACTIVE | Noted: 2025-07-25

## 2025-07-25 PROBLEM — Q66.52: Status: ACTIVE | Noted: 2025-07-25

## 2025-07-25 PROBLEM — G89.29 CHRONIC PAIN OF LEFT ANKLE: Status: ACTIVE | Noted: 2025-07-25

## 2025-07-25 PROBLEM — M85.662: Status: ACTIVE | Noted: 2025-07-25

## 2025-07-25 PROBLEM — M79.672 PAIN, FOOT, LEFT, CHRONIC: Status: ACTIVE | Noted: 2025-07-25

## 2025-07-25 PROCEDURE — 3700000000 HC ANESTHESIA ATTENDED CARE: Performed by: PODIATRIST

## 2025-07-25 PROCEDURE — 6360000002 HC RX W HCPCS: Performed by: NURSE ANESTHETIST, CERTIFIED REGISTERED

## 2025-07-25 PROCEDURE — 6360000002 HC RX W HCPCS: Performed by: PODIATRIST

## 2025-07-25 PROCEDURE — 3700000001 HC ADD 15 MINUTES (ANESTHESIA): Performed by: PODIATRIST

## 2025-07-25 PROCEDURE — 2580000003 HC RX 258: Performed by: ANESTHESIOLOGY

## 2025-07-25 PROCEDURE — 2720000010 HC SURG SUPPLY STERILE: Performed by: PODIATRIST

## 2025-07-25 PROCEDURE — 73630 X-RAY EXAM OF FOOT: CPT

## 2025-07-25 PROCEDURE — 2709999900 HC NON-CHARGEABLE SUPPLY: Performed by: PODIATRIST

## 2025-07-25 PROCEDURE — C1734 ORTH/DEVIC/DRUG BN/BN,TIS/BN: HCPCS | Performed by: PODIATRIST

## 2025-07-25 PROCEDURE — C1769 GUIDE WIRE: HCPCS | Performed by: PODIATRIST

## 2025-07-25 PROCEDURE — C1713 ANCHOR/SCREW BN/BN,TIS/BN: HCPCS | Performed by: PODIATRIST

## 2025-07-25 PROCEDURE — 73610 X-RAY EXAM OF ANKLE: CPT

## 2025-07-25 PROCEDURE — 3600000012 HC SURGERY LEVEL 2 ADDTL 15MIN: Performed by: PODIATRIST

## 2025-07-25 PROCEDURE — 3600000002 HC SURGERY LEVEL 2 BASE: Performed by: PODIATRIST

## 2025-07-25 PROCEDURE — C9359 IMPLNT,BON VOID FILLER-PUTTY: HCPCS | Performed by: PODIATRIST

## 2025-07-25 PROCEDURE — 7100000011 HC PHASE II RECOVERY - ADDTL 15 MIN: Performed by: PODIATRIST

## 2025-07-25 PROCEDURE — 7100000010 HC PHASE II RECOVERY - FIRST 15 MIN: Performed by: PODIATRIST

## 2025-07-25 PROCEDURE — 7100000031 HC ASPR PHASE II RECOVERY - ADDTL 15 MIN: Performed by: PODIATRIST

## 2025-07-25 PROCEDURE — 7100000030 HC ASPR PHASE II RECOVERY - FIRST 15 MIN: Performed by: PODIATRIST

## 2025-07-25 PROCEDURE — 6370000000 HC RX 637 (ALT 250 FOR IP): Performed by: ANESTHESIOLOGY

## 2025-07-25 PROCEDURE — 73650 X-RAY EXAM OF HEEL: CPT

## 2025-07-25 PROCEDURE — 7100000001 HC PACU RECOVERY - ADDTL 15 MIN: Performed by: PODIATRIST

## 2025-07-25 PROCEDURE — 7100000000 HC PACU RECOVERY - FIRST 15 MIN: Performed by: PODIATRIST

## 2025-07-25 PROCEDURE — 2500000003 HC RX 250 WO HCPCS: Performed by: NURSE ANESTHETIST, CERTIFIED REGISTERED

## 2025-07-25 DEVICE — INJECTABLE PUTTY
Type: IMPLANTABLE DEVICE | Site: FOOT | Status: FUNCTIONAL
Brand: ALLOMATRIX

## 2025-07-25 DEVICE — IMPLANTABLE DEVICE
Type: IMPLANTABLE DEVICE | Site: FOOT | Status: FUNCTIONAL
Brand: 4WEB MEDICAL

## 2025-07-25 DEVICE — HEADLESS COMPRESSION SCREW
Type: IMPLANTABLE DEVICE | Site: FOOT | Status: FUNCTIONAL
Brand: FIXOS

## 2025-07-25 DEVICE — ALLOGRAFT BNE 5 CC DEMINERALIZED BNE MTRX FIBER ALLOFIBER: Type: IMPLANTABLE DEVICE | Site: FOOT | Status: FUNCTIONAL

## 2025-07-25 DEVICE — INJECTABLE KIT
Type: IMPLANTABLE DEVICE | Site: FOOT | Status: FUNCTIONAL
Brand: AUGMENT® INJECTABLE

## 2025-07-25 RX ORDER — FENTANYL CITRATE 50 UG/ML
INJECTION, SOLUTION INTRAMUSCULAR; INTRAVENOUS
Status: DISCONTINUED | OUTPATIENT
Start: 2025-07-25 | End: 2025-07-25 | Stop reason: SDUPTHER

## 2025-07-25 RX ORDER — LABETALOL HYDROCHLORIDE 5 MG/ML
10 INJECTION, SOLUTION INTRAVENOUS
Status: DISCONTINUED | OUTPATIENT
Start: 2025-07-25 | End: 2025-07-25 | Stop reason: HOSPADM

## 2025-07-25 RX ORDER — ACETAMINOPHEN 500 MG
1000 TABLET ORAL ONCE
Status: COMPLETED | OUTPATIENT
Start: 2025-07-25 | End: 2025-07-25

## 2025-07-25 RX ORDER — DIPHENHYDRAMINE HYDROCHLORIDE 50 MG/ML
12.5 INJECTION, SOLUTION INTRAMUSCULAR; INTRAVENOUS
Status: DISCONTINUED | OUTPATIENT
Start: 2025-07-25 | End: 2025-07-25 | Stop reason: HOSPADM

## 2025-07-25 RX ORDER — FENTANYL CITRATE 0.05 MG/ML
25 INJECTION, SOLUTION INTRAMUSCULAR; INTRAVENOUS EVERY 5 MIN PRN
Status: DISCONTINUED | OUTPATIENT
Start: 2025-07-25 | End: 2025-07-25 | Stop reason: HOSPADM

## 2025-07-25 RX ORDER — DEXAMETHASONE SODIUM PHOSPHATE 4 MG/ML
INJECTION, SOLUTION INTRA-ARTICULAR; INTRALESIONAL; INTRAMUSCULAR; INTRAVENOUS; SOFT TISSUE
Status: DISCONTINUED | OUTPATIENT
Start: 2025-07-25 | End: 2025-07-25 | Stop reason: SDUPTHER

## 2025-07-25 RX ORDER — ACETAMINOPHEN 325 MG/1
650 TABLET ORAL
Status: DISCONTINUED | OUTPATIENT
Start: 2025-07-25 | End: 2025-07-25 | Stop reason: HOSPADM

## 2025-07-25 RX ORDER — ONDANSETRON 2 MG/ML
4 INJECTION INTRAMUSCULAR; INTRAVENOUS
Status: DISCONTINUED | OUTPATIENT
Start: 2025-07-25 | End: 2025-07-25 | Stop reason: HOSPADM

## 2025-07-25 RX ORDER — CEPHALEXIN 500 MG/1
500 CAPSULE ORAL 2 TIMES DAILY
Qty: 14 CAPSULE | Refills: 0 | Status: SHIPPED | OUTPATIENT
Start: 2025-07-25 | End: 2025-08-01

## 2025-07-25 RX ORDER — HYDRALAZINE HYDROCHLORIDE 20 MG/ML
10 INJECTION INTRAMUSCULAR; INTRAVENOUS
Status: DISCONTINUED | OUTPATIENT
Start: 2025-07-25 | End: 2025-07-25 | Stop reason: HOSPADM

## 2025-07-25 RX ORDER — SODIUM CHLORIDE 0.9 % (FLUSH) 0.9 %
5-40 SYRINGE (ML) INJECTION EVERY 12 HOURS SCHEDULED
Status: DISCONTINUED | OUTPATIENT
Start: 2025-07-25 | End: 2025-07-25 | Stop reason: HOSPADM

## 2025-07-25 RX ORDER — SODIUM CHLORIDE 9 MG/ML
INJECTION, SOLUTION INTRAVENOUS PRN
Status: DISCONTINUED | OUTPATIENT
Start: 2025-07-25 | End: 2025-07-25 | Stop reason: HOSPADM

## 2025-07-25 RX ORDER — GABAPENTIN 300 MG/1
300 CAPSULE ORAL ONCE
Status: COMPLETED | OUTPATIENT
Start: 2025-07-25 | End: 2025-07-25

## 2025-07-25 RX ORDER — OXYCODONE AND ACETAMINOPHEN 5; 325 MG/1; MG/1
1 TABLET ORAL EVERY 6 HOURS PRN
Qty: 28 TABLET | Refills: 0 | Status: SHIPPED | OUTPATIENT
Start: 2025-07-25 | End: 2025-07-29 | Stop reason: SDUPTHER

## 2025-07-25 RX ORDER — ONDANSETRON 2 MG/ML
INJECTION INTRAMUSCULAR; INTRAVENOUS
Status: DISCONTINUED | OUTPATIENT
Start: 2025-07-25 | End: 2025-07-25 | Stop reason: SDUPTHER

## 2025-07-25 RX ORDER — PROPOFOL 10 MG/ML
INJECTION, EMULSION INTRAVENOUS
Status: DISCONTINUED | OUTPATIENT
Start: 2025-07-25 | End: 2025-07-25 | Stop reason: SDUPTHER

## 2025-07-25 RX ORDER — LIDOCAINE HYDROCHLORIDE 10 MG/ML
1 INJECTION, SOLUTION EPIDURAL; INFILTRATION; INTRACAUDAL; PERINEURAL
Status: DISCONTINUED | OUTPATIENT
Start: 2025-07-25 | End: 2025-07-25 | Stop reason: HOSPADM

## 2025-07-25 RX ORDER — METOCLOPRAMIDE HYDROCHLORIDE 5 MG/ML
10 INJECTION INTRAMUSCULAR; INTRAVENOUS
Status: DISCONTINUED | OUTPATIENT
Start: 2025-07-25 | End: 2025-07-25 | Stop reason: HOSPADM

## 2025-07-25 RX ORDER — EPHEDRINE SULFATE/0.9% NACL/PF 25 MG/5 ML
SYRINGE (ML) INTRAVENOUS
Status: DISCONTINUED | OUTPATIENT
Start: 2025-07-25 | End: 2025-07-25 | Stop reason: SDUPTHER

## 2025-07-25 RX ORDER — LIDOCAINE HYDROCHLORIDE 20 MG/ML
INJECTION, SOLUTION EPIDURAL; INFILTRATION; INTRACAUDAL; PERINEURAL
Status: DISCONTINUED | OUTPATIENT
Start: 2025-07-25 | End: 2025-07-25 | Stop reason: SDUPTHER

## 2025-07-25 RX ORDER — ROCURONIUM BROMIDE 10 MG/ML
INJECTION, SOLUTION INTRAVENOUS
Status: DISCONTINUED | OUTPATIENT
Start: 2025-07-25 | End: 2025-07-25 | Stop reason: SDUPTHER

## 2025-07-25 RX ORDER — SODIUM CHLORIDE 0.9 % (FLUSH) 0.9 %
5-40 SYRINGE (ML) INJECTION PRN
Status: DISCONTINUED | OUTPATIENT
Start: 2025-07-25 | End: 2025-07-25 | Stop reason: HOSPADM

## 2025-07-25 RX ORDER — SODIUM CHLORIDE, SODIUM LACTATE, POTASSIUM CHLORIDE, CALCIUM CHLORIDE 600; 310; 30; 20 MG/100ML; MG/100ML; MG/100ML; MG/100ML
INJECTION, SOLUTION INTRAVENOUS CONTINUOUS
Status: DISCONTINUED | OUTPATIENT
Start: 2025-07-25 | End: 2025-07-25 | Stop reason: HOSPADM

## 2025-07-25 RX ORDER — MEPERIDINE HYDROCHLORIDE 25 MG/ML
12.5 INJECTION INTRAMUSCULAR; INTRAVENOUS; SUBCUTANEOUS EVERY 5 MIN PRN
Status: DISCONTINUED | OUTPATIENT
Start: 2025-07-25 | End: 2025-07-25 | Stop reason: HOSPADM

## 2025-07-25 RX ADMIN — ACETAMINOPHEN 1000 MG: 500 TABLET ORAL at 06:42

## 2025-07-25 RX ADMIN — PROPOFOL 200 MG: 10 INJECTION, EMULSION INTRAVENOUS at 07:55

## 2025-07-25 RX ADMIN — EPHEDRINE SULFATE 10 MG: 5 INJECTION INTRAVENOUS at 08:07

## 2025-07-25 RX ADMIN — SODIUM CHLORIDE, POTASSIUM CHLORIDE, SODIUM LACTATE AND CALCIUM CHLORIDE: 600; 310; 30; 20 INJECTION, SOLUTION INTRAVENOUS at 06:38

## 2025-07-25 RX ADMIN — FENTANYL CITRATE 50 MCG: 50 INJECTION INTRAMUSCULAR; INTRAVENOUS at 12:16

## 2025-07-25 RX ADMIN — ONDANSETRON 4 MG: 2 INJECTION, SOLUTION INTRAMUSCULAR; INTRAVENOUS at 12:17

## 2025-07-25 RX ADMIN — PHENYLEPHRINE HYDROCHLORIDE 100 MCG: 10 INJECTION INTRAVENOUS at 11:00

## 2025-07-25 RX ADMIN — SODIUM CHLORIDE, POTASSIUM CHLORIDE, SODIUM LACTATE AND CALCIUM CHLORIDE: 600; 310; 30; 20 INJECTION, SOLUTION INTRAVENOUS at 07:54

## 2025-07-25 RX ADMIN — LIDOCAINE HYDROCHLORIDE 80 MG: 20 INJECTION, SOLUTION EPIDURAL; INFILTRATION; INTRACAUDAL; PERINEURAL at 07:55

## 2025-07-25 RX ADMIN — EPHEDRINE SULFATE 5 MG: 5 INJECTION INTRAVENOUS at 08:37

## 2025-07-25 RX ADMIN — FENTANYL CITRATE 100 MCG: 50 INJECTION INTRAMUSCULAR; INTRAVENOUS at 07:55

## 2025-07-25 RX ADMIN — SUGAMMADEX 200 MG: 100 INJECTION, SOLUTION INTRAVENOUS at 12:17

## 2025-07-25 RX ADMIN — FENTANYL CITRATE 50 MCG: 50 INJECTION INTRAMUSCULAR; INTRAVENOUS at 12:20

## 2025-07-25 RX ADMIN — ROCURONIUM BROMIDE 50 MG: 10 INJECTION, SOLUTION INTRAVENOUS at 07:55

## 2025-07-25 RX ADMIN — EPHEDRINE SULFATE 5 MG: 5 INJECTION INTRAVENOUS at 08:24

## 2025-07-25 RX ADMIN — DEXAMETHASONE SODIUM PHOSPHATE 4 MG: 4 INJECTION INTRA-ARTICULAR; INTRALESIONAL; INTRAMUSCULAR; INTRAVENOUS; SOFT TISSUE at 08:10

## 2025-07-25 RX ADMIN — EPHEDRINE SULFATE 5 MG: 5 INJECTION INTRAVENOUS at 09:13

## 2025-07-25 RX ADMIN — GABAPENTIN 300 MG: 300 CAPSULE ORAL at 06:42

## 2025-07-25 ASSESSMENT — PAIN SCALES - GENERAL
PAINLEVEL_OUTOF10: 0
PAINLEVEL_OUTOF10: 2
PAINLEVEL_OUTOF10: 2

## 2025-07-25 ASSESSMENT — PAIN - FUNCTIONAL ASSESSMENT
PAIN_FUNCTIONAL_ASSESSMENT: NONE - DENIES PAIN
PAIN_FUNCTIONAL_ASSESSMENT: NONE - DENIES PAIN
PAIN_FUNCTIONAL_ASSESSMENT: 0-10

## 2025-07-25 NOTE — OP NOTE
Operative Note      Patient: Ananth Graham  YOB: 1970  MRN: [id number]    Date of Procedure: 7/25/2025    Pre-Op Diagnosis Codes:   Talocalcaneal osseous tarsal coalition, left lower extremity  Congenital pes planovalgus, left foot  Cyst of fibula, left lower extremity  Arthritis of calcaneocuboid joint, left lower extremity  Impingement syndrome, left ankle  Congenital rearfoot valgus, left foot  Foot pain, left lower extremity    Post-Op Diagnosis: Same       Procedure(s):  Osteotomy of tarsal bones, Cotton osteotomy, left foot  Fusion of subtalar joint, left foot  Fusion of midtarsal joint, calcaneocuboid joint, left lower extremity  Tarsal coalition excision, left foot  Bone marrow harvest transplantation autologous, left lower extremity  Curettage of cyst of fibula with allograft, left lower extremity    Surgeon(s):  Bigg Goodman DPM    Assistant:   Resident: Vern Anderson DPM; Danielle Ramirez DPM    Anesthesia: General    Hemostasis: Pneumatic tourniquet to left thigh @ 300 mmHg for 120 minutes. Tourniquet was deflated for 20 minutes and reinflated to left thigh @ 300 mmHg for 66 minutes     Injectables: 20 cc mix of 1% lidocaine plain and 0.5% marcaine plain     Estimated Blood Loss (mL): Less than     Complications: None    Specimens:   * No specimens in log *    Implants:  Implant Name Type Inv. Item Serial No.  Lot No. LRB No. Used Action   WEDGE BNE W 30 X H 12 MM LG UTIL OSTEOTOM 3D PRNT TRUSS - [id number]  WEDGE BNE W 30 X H 12 MM LG UTIL OSTEOTOM 3D PRNT TRUSS  [facility] [id number] Left 1 Implanted   GRAFT BNE SUB 10CC DBM BIOCOMPOSITE OSTEOSET PTTY INJ - [id number]  GRAFT BNE SUB 10CC DBM BIOCOMPOSITE OSTEOSET PTTY INJ [id number] virtual tweens ltd [facility]  Left 1 Implanted   GRAFT BNE INJ 3 CC AUG - [id number]  GRAFT BNE INJ 3 CC AUG  [employer] [facility] [id number] Left 1 Implanted   WEDGE BNE W 20 X H 8 MM MED COTTON OSTEOTOM 3D PRNT

## 2025-07-25 NOTE — BRIEF OP NOTE
PODIATRY BRIEF OP NOTE    PATIENT NAME: Ananth Graham  YOB: 1970  -  55 y.o. female  MRN: [id number]  DATE: 7/25/2025  BILLING #: [id number]    Surgeon(s):  Bigg Goodman, DARLYNM     ASSISTANTS: Danielle Diaz DPM PGY3; Vern Anderson DPM PGY1; Fareed Del Rio, MS4    PRE-OP DIAGNOSIS:   Talocalcaneal osseous tarsal coalition, left lower extremity  Congenital pes planovalgus, left foot  Cyst of fibula, left lower extremity  Arthritis of calcaneocuboid joint, left lower extremity  Impingement syndrome, left ankle  Congenital rearfoot valgus, left foot  Foot pain, left lower extremity    POST-OP DIAGNOSIS: Same as above.    PROCEDURE:   Osteotomy of tarsal bones, Cotton osteotomy, left foot  Fusion of subtalar joint, left foot  Fusion of midtarsal joint, calcaneocuboid joint, left lower extremity  Tarsal coalition excision, left foot  Bone marrow harvest transplantation autologous, left lower extremity  Curettage of cyst of fibula with allograft, left lower extremity    ANESTHESIA: General    HEMOSTASIS: Pneumatic tourniquet to left thigh @ 300 mmHg for 120 minutes. Tourniquet was deflated for 20 minutes and reinflated to left thigh @ 300 mmHg for 66 minutes.    ESTIMATED BLOOD LOSS: Less than 30 cc    MATERIALS:   Implant Name Type Inv. Item Serial No.  Lot No. LRB No. Used Action   WEDGE BNE W 30 X H 12 MM LG UTIL OSTEOTOM 3D PRNT TRUSS - [id number]  WEDGE BNE W 30 X H 12 MM LG UTIL OSTEOTOM 3D PRNT TRUSS  4 WEB [facility] [id number] Left 1 Implanted   GRAFT BNE SUB 10CC DBM BIOCOMPOSITE OSTEOSET PTTY INJ - [id number]  GRAFT BNE SUB 10CC DBM BIOCOMPOSITE OSTEOSET PTTY INJ [id number] globa.ly [facility]  Left 1 Implanted   GRAFT BNE INJ 3 CC AUG - [id number]  GRAFT BNE INJ 3 CC AUG  [employer] [facility] [id number] Left 1 Implanted   WEDGE BNE W 20 X H 8 MM MED COTTON OSTEOTOM 3D PRNT TRUSS - [id number]  WEDGE BNE W 20 X H 8 MM MED COTTON OSTEOTOM 3D PRNT TRUSS  4 WEB  Subjective:       Patient ID: Rosamaria Agosto is a 58 y.o. female.        Chief Complaint: Generalized Body Aches and Chills    Other   This is a new problem. The current episode started yesterday (last night ). The problem occurs constantly. The problem has been gradually worsening. Associated symptoms include chills, congestion, coughing, headaches and weakness. Pertinent negatives include no abdominal pain, chest pain, fever, myalgias, nausea or sore throat. Nothing aggravates the symptoms. She has tried nothing for the symptoms.     Review of Systems   Constitution: Positive for chills, weakness and malaise/fatigue. Negative for fever.   HENT: Positive for congestion. Negative for ear pain, hoarse voice and sore throat.         Ttp of the maxillary sinuses, posterior pharyngeal oral mucasal drip   Eyes: Negative for discharge and redness.   Cardiovascular: Negative for chest pain, dyspnea on exertion and leg swelling.   Respiratory: Positive for cough. Negative for shortness of breath, sputum production and wheezing.    Musculoskeletal: Negative for myalgias.   Gastrointestinal: Negative for abdominal pain and nausea.   Neurological: Positive for headaches.       Objective:      Physical Exam   Constitutional: She is oriented to person, place, and time. She appears well-developed and well-nourished. She is cooperative.  Non-toxic appearance. She does not appear ill. No distress.   HENT:   Head: Normocephalic and atraumatic.   Right Ear: Hearing, tympanic membrane, external ear and ear canal normal.   Left Ear: Hearing, tympanic membrane, external ear and ear canal normal.   Nose: No mucosal edema, rhinorrhea or nasal deformity. No epistaxis. Right sinus exhibits no maxillary sinus tenderness and no frontal sinus tenderness. Left sinus exhibits no maxillary sinus tenderness and no frontal sinus tenderness.   Mouth/Throat: Uvula is midline, oropharynx is clear and moist and mucous membranes are normal. No  trismus in the jaw. Normal dentition. No uvula swelling. No posterior oropharyngeal erythema.   ttp of the maxillary sinuses right worse than left   Eyes: Conjunctivae and lids are normal. No scleral icterus.   Sclera clear bilat   Neck: Trachea normal, full passive range of motion without pain and phonation normal. Neck supple.   Cardiovascular: Normal rate, regular rhythm, normal heart sounds, intact distal pulses and normal pulses.    Pulmonary/Chest: Effort normal and breath sounds normal. No respiratory distress.   Abdominal: Soft. Normal appearance and bowel sounds are normal. She exhibits no distension. There is no tenderness.   Musculoskeletal: Normal range of motion. She exhibits no edema or deformity.   Neurological: She is alert and oriented to person, place, and time. She exhibits normal muscle tone. Coordination normal.   Skin: Skin is warm, dry and intact. She is not diaphoretic. No pallor.   Psychiatric: She has a normal mood and affect. Her speech is normal and behavior is normal. Cognition and memory are normal.   Nursing note and vitals reviewed.        Flu swab negative    Assessment:       1. Body aches    2. Acute non-recurrent maxillary sinusitis        Plan:       Rosamaria was seen today for generalized body aches and chills.    Diagnoses and all orders for this visit:    Body aches  -     POCT Influenza A/B    Acute non-recurrent maxillary sinusitis    Other orders  -     betamethasone acetate-betamethasone sodium phosphate injection 9 mg; Inject 1.5 mLs (9 mg total) into the muscle one time.  -     cefdinir (OMNICEF) 300 MG capsule; Take 1 capsule (300 mg total) by mouth every 12 (twelve) hours.  -     guaifenesin-codeine 100-10 mg/5 ml (TUSSI-ORGANIDIN NR)  mg/5 mL syrup; Take 10 mLs by mouth every 8 (eight) hours as needed for Cough (FOR SEVERE COUG WHEN NOT DRIVING OR AT NIGHT).      Patient Instructions   REST AND HYDRATE WITH PLENTY OF FLUIDS  OTC CLARITIN FOR CONGESTION (OR ZYRTEC  OR ALLEGRA)  FLU SWAB NEGATIVE  1.5 CC CELESTONE GIVEN IN CLINIC  CEFDINIR RX  CHERATUSSIN AC RX FOR SEVERE COUGH. MAY HELP WITH ACHES AS WELL  OTC NASAL SPRAY     SEE SINUSITIS SHEET  FOLLOW UP WITH PCP  RETURN FOR ANY CONCERNS OR PROBLEMS  Sinusitis (Antibiotic Treatment)    The sinuses are air-filled spaces within the bones of the face. They connect to the inside of the nose. Sinusitis is an inflammation of the tissue lining the sinus cavity. Sinus inflammation can occur during a cold. It can also be due to allergies to pollens and other particles in the air. Sinusitis can cause symptoms of sinus congestion and fullness. A sinus infection causes fever, headache and facial pain. There is often green or yellow drainage from the nose or into the back of the throat (post-nasal drip). You have been given antibiotics to treat this condition.  Home care:  · Take the full course of antibiotics as instructed. Do not stop taking them, even if you feel better.  · Drink plenty of water, hot tea, and other liquids. This may help thin mucus. It also may promote sinus drainage.  · Heat may help soothe painful areas of the face. Use a towel soaked in hot water. Or,  the shower and direct the hot spray onto your face. Using a vaporizer along with a menthol rub at night may also help.   · An expectorant containing guaifenesin may help thin the mucus and promote drainage from the sinuses.  · Over-the-counter decongestants may be used unless a similar medicine was prescribed. Nasal sprays work the fastest. Use one that contains phenylephrine or oxymetazoline. First blow the nose gently. Then use the spray. Do not use these medicines more often than directed on the label or symptoms may get worse. You may also use tablets containing pseudoephedrine. Avoid products that combine ingredients, because side effects may be increased. Read labels. You can also ask the pharmacist for help. (NOTE: Persons with high blood pressure  should not use decongestants. They can raise blood pressure.)  · Over-the-counter antihistamines may help if allergies contributed to your sinusitis.    · Do not use nasal rinses or irrigation during an acute sinus infection, unless told to by your health care provider. Rinsing may spread the infection to other sinuses.  · Use acetaminophen or ibuprofen to control pain, unless another pain medicine was prescribed. (If you have chronic liver or kidney disease or ever had a stomach ulcer, talk with your doctor before using these medicines. Aspirin should never be used in anyone under 18 years of age who is ill with a fever. It may cause severe liver damage.)  · Don't smoke. This can worsen symptoms.  Follow-up care  Follow up with your healthcare provider or our staff if you are not improving within the next week.  When to seek medical advice  Call your healthcare provider if any of these occur:  · Facial pain or headache becoming more severe  · Stiff neck  · Unusual drowsiness or confusion  · Swelling of the forehead or eyelids  · Vision problems, including blurred or double vision  · Fever of 100.4ºF (38ºC) or higher, or as directed by your healthcare provider  · Seizure  · Breathing problems  · Symptoms not resolving within 10 days  Date Last Reviewed: 4/13/2015  © 7751-0102 [employer] [address] [place] [address], [place] [address]. All rights reserved. This information is not intended as a substitute for professional medical care. Always follow your healthcare professional's instructions.

## 2025-07-25 NOTE — ANESTHESIA POSTPROCEDURE EVALUATION
Department of Anesthesiology  Postprocedure Note    Patient: Ananth Graham  MRN: [id number]  YOB: 1970  Date of evaluation: 7/25/2025    Procedure Summary       Date: 07/25/25 Room / Location: [address]    Anesthesia Start: 0755 Anesthesia Stop: 1234    Procedure: RESECTION OF COALITION  AND SUBTALAR JOINT FUSION WITH EXCISION CYST DISTAL FIBULA AND BONE MARROW HARVEST  ALLOGRAFT BONE WEDGE TALONAVICULAR JOINT FUSION  COTTON OSTEOTOMY MEDICAL DISPLACEMENT CALCANEAL OSTEOTOMY MINIMAL INVASIVE SURGERY FOOT LEFT (Left: Foot) Diagnosis:       Acquired flat foot, left      Tarsal coalition of left foot      (Acquired flat foot, left [M21.42])      (Tarsal coalition of left foot [Q66.89])    Surgeons: Bigg Goodman DPM Responsible Provider: Jerod Hodge MD    Anesthesia Type: general ASA Status: 2            Anesthesia Type: No value filed.    Marla Phase I: Marla Score: 10    Marla Phase II: Marla Score: 10    Anesthesia Post Evaluation    Comments: POST- ANESTHESIA EVALUATION       Pt Name: Ananth Graham  MRN: [id number]  YOB: 1970  Date of evaluation: 7/25/2025  Time:  2:54 PM      /69   Pulse 79   Temp 98.1 °F (36.7 °C) (Temporal)   Resp 18   Ht 1.727 m (5' 8\")   Wt 68 kg (150 lb)   SpO2 95%   BMI 22.81 kg/m²      Consciousness Level  Awake  Cardiopulmonary Status  Stable  Pain Adequately Treated YES  Nausea / Vomiting  NO  Adequate Hydration  YES  Anesthesia Related Complications NONE      Electronically signed by Selene Resendez MD on 7/25/2025 at 2:54 PM      No notable events documented.

## 2025-07-25 NOTE — DISCHARGE INSTRUCTIONS
Podiatric Post Operative Instructions:  You have had a surgical procedure on your left foot.      Fluids and Diet:  Begin with clear liquids, broth, dry toast, and crackers.  If not nauseated then resume your regular pre-operative diet when you are ready    Medications:  Take your prescriptions as directed  You are receiving new prescriptions for Percocet and cephalexin  You received popliteal block (nerve block in the back of knee)- this should manage your pain for the first 18hrs post operatively  If your pain is not severe then you may take the non-prescription medication that you normally take for aches and pains ie Tylenol and Ibuprofen (alternating), or if severe pain occurs these will serve as additional medication in conjuction with the Percocet  You may resume your regularly scheduled medications (unless otherwise directed)  If any side effects or adverse reactions occur, discontinue the medication and contact your doctor.  Review the patient drug information that is provided before you take any medication    Ambulation and Activity:  You are advised to go directly home from the hospital  Use crutches as needed  We recommend knee scooter if possible, you can also obtain these on [employer] for rather affordable and quickly obtainable.  You may not put weight on the operated foot.  Do not walk on the left foot.  Avoid stairs.  Do not lift or move heavy objects  Do not drive until cleared by your physician    Bandage and Wound Care Instructions:  Keep bandage clean and dry  Do NOT remove dressing/ splint  DO NOT get wet  Please use shower cover around leg if you do shower so that dressing does not get wet  Do not shower or bathe the operative extremity  Do not remove the bandage (unless otherwise directed)   Do not attempt to put anything between the cast or dressing and your skin, some itching is normal.    Ice and Elevation:  Elevate operative extremity as much as possible to reduce swelling and

## 2025-07-25 NOTE — INTERVAL H&P NOTE
Update History & Physical    The patient's History and Physical of July 3, 2025 was reviewed with the patient and I examined the patient. There was no change. The surgical site was confirmed by the patient and me.     Ananth Graham is a 55 y.o. female  undergoing GASTROCNEMIUS RECESSION LEFT FOOT  and RESECTION OF COALITION  AND SUBTALAR JOINT FUSION WITH EXCISION CYST DISTAL FEMUR AND BONE MARROW HARVEST  POSSIBLE ALLOGRAFT BONE WEDGE POSSIBLE TALONAVICULAR JOINT FUSION POSSIBLE COTTON OSTEOTOMY   POSSIBLE MEDICAL DISPLACEMENT CALCANEAL OSTEOTOMY MINIMAL INVASIVE SURGERY FOOT LEFT  for Acquired flat foot, left  and  Tarsal coalition of left foot     Pt denies fever/chills, chest pain or SOB  Pt NPO since MN  Pt denies hx MRSA  Pt denies hx of blood clots Lungs/legs  Anticoagulation ibuprofen 2 days ago  Denies personal hx and family hx of complications with anesthesia  Physical exam remains unchanged including cardiac and pulmonary assessment  Medical History Reviewed  See nursing flow sheet for vital signs         Electronically signed by KAELA Marion CNP on 7/25/2025 at 6:01 AM

## 2025-07-25 NOTE — ANESTHESIA PRE PROCEDURE
Department of Anesthesiology  Preprocedure Note       Name:  Ananth Graham   Age:  55 y.o.  :  1970                                          MRN:  [id number]         Date:  2025      Surgeon: Surgeon(s):  Bigg Goodman DPM    Procedure: Procedure(s):  GASTROCNEMIUS RECESSION LEFT FOOT  RESECTION OF COALITION  AND SUBTALAR JOINT FUSION WITH EXCISION CYST DISTAL FEMUR AND BONE MARROW HARVEST  POSSIBLE ALLOGRAFT BONE WEDGE POSSIBLE TALONAVICULAR JOINT FUSION POSSIBLE COTTON OSTEOTOMY   POSSIBLE MEDICAL DISPLACEMENT CALCANEAL OSTEOTOMY MINIMAL INVASIVE SURGERY FOOT LEFT    Medications prior to admission:   Prior to Admission medications    Medication Sig Start Date End Date Taking? Authorizing Provider   progesterone (PROMETRIUM) 100 MG CAPS capsule Take 1 capsule by mouth daily 7/15/25  Yes Roxane Solis APRN - CNP   estradiol (CLIMARA) 0.05 MG/24HR Place 1 patch onto the skin once a week 7/15/25  Yes Roxane Solis APRN - CNP   escitalopram (LEXAPRO) 10 MG tablet Take 1 tablet by mouth daily 3/18/25  Yes Serenity Meléndez APRN - CNP   levothyroxine (SYNTHROID) 88 MCG tablet Take 1 tablet by mouth every morning 23  Yes Batsheva Cagle MD   Magnesium Gluconate 550 MG TABS Take 550 mg by mouth nightly   Yes Batsheva Cagle MD       Current medications:    Current Facility-Administered Medications   Medication Dose Route Frequency Provider Last Rate Last Admin   • sodium chloride flush 0.9 % injection 5-40 mL  5-40 mL IntraVENous 2 times per day Trino Gunn MD       • sodium chloride flush 0.9 % injection 5-40 mL  5-40 mL IntraVENous PRN Trino Gunn MD       • 0.9 % sodium chloride infusion   IntraVENous PRN Trino Gunn MD       • lactated ringers infusion   IntraVENous Continuous Trino Gunn  mL/hr at 25 0638 New Bag at 25 0638   • lidocaine PF 1 % injection 1 mL  1 mL IntraDERmal Once PRN Trino Gunn MD           Allergies:  No Known

## 2025-07-29 ENCOUNTER — OFFICE VISIT (OUTPATIENT)
Dept: PODIATRY | Age: 55
End: 2025-07-29

## 2025-07-29 VITALS — BODY MASS INDEX: 22.73 KG/M2 | WEIGHT: 150 LBS | HEIGHT: 68 IN

## 2025-07-29 DIAGNOSIS — Q66.89 TARSAL COALITION OF LEFT FOOT: ICD-10-CM

## 2025-07-29 DIAGNOSIS — M25.872 IMPINGEMENT SYNDROME OF LEFT ANKLE: ICD-10-CM

## 2025-07-29 DIAGNOSIS — Z98.890 POST-OPERATIVE STATE: ICD-10-CM

## 2025-07-29 DIAGNOSIS — M79.605 PAIN OF LEFT LOWER EXTREMITY: ICD-10-CM

## 2025-07-29 DIAGNOSIS — Q66.6 CONGENITAL REARFOOT VALGUS: ICD-10-CM

## 2025-07-29 DIAGNOSIS — G89.18 POST-OP PAIN: ICD-10-CM

## 2025-07-29 DIAGNOSIS — M21.42 ACQUIRED FLAT FOOT, LEFT: Primary | ICD-10-CM

## 2025-07-29 DIAGNOSIS — Q66.6 CONGENITAL PES PLANOVALGUS: ICD-10-CM

## 2025-07-29 PROCEDURE — 99024 POSTOP FOLLOW-UP VISIT: CPT | Performed by: PODIATRIST

## 2025-07-29 RX ORDER — OXYCODONE AND ACETAMINOPHEN 5; 325 MG/1; MG/1
1 TABLET ORAL EVERY 6 HOURS PRN
Qty: 28 TABLET | Refills: 0 | Status: SHIPPED | OUTPATIENT
Start: 2025-07-29 | End: 2025-08-05

## 2025-07-29 NOTE — PROGRESS NOTES
[facility]  Post Op note  No chief complaint on file.      Ananth Graham is a 55 y.o. year old female who is 4 day(s) post op from foot surgery. Type: Procedure(s): 7/25/25  Osteotomy of tarsal bones, Cotton osteotomy, left foot  Fusion of subtalar joint, left foot  Fusion of midtarsal joint, calcaneocuboid joint, left lower extremity  Tarsal coalition excision, left foot  Bone marrow harvest transplantation autologous, left lower extremity  Curettage of cyst of fibula with allograft, left       . Vital signs are stable.  Pain level is 5. Patient denies N/V/F/C. Patient has been compliant with postoperative instructions.  Controlled with percocet    Review of Systems    Vascular: DP and PT pulses palpable 2/4, Right Foot and 2/4 on the Left Foot.     CFT <3 seconds, Right Foot and <3 seconds on the Left Foot.    Edema is absent,  Right Foot and presenton the Left Foot.      Neurological:   Sensation present  to light touch to level of digits, the left foot.      Musculoskeletal:   Muscle strength is 5/5 on the Right Foot and guarded/5 on the Left Foot. Structural deformities are absent on the Right Foot and absent on the Left Foot.       Integument:  Warm, dry, supple both feet.  Incisions are healing well.   Wound dehiscence is absent. Infection is absent.          Assesment : Post operative progress gradually improving.   Diagnosis Orders   1. Acquired flat foot, left        2. Tarsal coalition of left foot        3. Impingement syndrome of left ankle        4. Pain of left lower extremity        5. Congenital rearfoot valgus        6. Congenital pes planovalgus        7. Post-operative state              Plan: Sutures in place. Dry sterile dressing applied. Keep surgical site dry. Ice and elevation as directed.    Large bulky compressive wrap applied with 2 layers of cast padding, ACE wraps, followed by a posterior fiberglass splint secured with Coban.        No orders of the defined types were

## 2025-08-06 LAB — CYTOLOGY REPORT: NORMAL

## 2025-08-13 ENCOUNTER — OFFICE VISIT (OUTPATIENT)
Dept: PODIATRY | Age: 55
End: 2025-08-13

## 2025-08-13 VITALS — BODY MASS INDEX: 22.73 KG/M2 | WEIGHT: 150 LBS | HEIGHT: 68 IN

## 2025-08-13 DIAGNOSIS — M25.872 IMPINGEMENT SYNDROME OF LEFT ANKLE: ICD-10-CM

## 2025-08-13 DIAGNOSIS — M79.605 PAIN OF LEFT LOWER EXTREMITY: ICD-10-CM

## 2025-08-13 DIAGNOSIS — M21.42 ACQUIRED FLAT FOOT, LEFT: Primary | ICD-10-CM

## 2025-08-13 DIAGNOSIS — Q66.6 CONGENITAL PES PLANOVALGUS: ICD-10-CM

## 2025-08-13 DIAGNOSIS — Z98.890 POST-OPERATIVE STATE: ICD-10-CM

## 2025-08-13 DIAGNOSIS — Q66.6 CONGENITAL REARFOOT VALGUS: ICD-10-CM

## 2025-08-13 DIAGNOSIS — Q66.89 TARSAL COALITION OF LEFT FOOT: ICD-10-CM

## 2025-08-14 ENCOUNTER — HOSPITAL ENCOUNTER (OUTPATIENT)
Age: 55
Discharge: HOME | End: 2025-08-16
Payer: COMMERCIAL

## 2025-08-14 ENCOUNTER — RESULTS FOLLOW-UP (OUTPATIENT)
Dept: OBGYN CLINIC | Age: 55
End: 2025-08-14

## 2025-08-14 VITALS — WEIGHT: 150 LBS | BODY MASS INDEX: 22.81 KG/M2

## 2025-08-14 DIAGNOSIS — Z12.31 SCREENING MAMMOGRAM FOR BREAST CANCER: ICD-10-CM

## 2025-08-14 PROCEDURE — 77063 BREAST TOMOSYNTHESIS BI: CPT

## 2025-08-28 ENCOUNTER — OFFICE VISIT (OUTPATIENT)
Dept: PODIATRY | Age: 55
End: 2025-08-28

## 2025-08-28 VITALS — HEIGHT: 68 IN | BODY MASS INDEX: 22.73 KG/M2 | WEIGHT: 150 LBS

## 2025-08-28 DIAGNOSIS — M21.42 ACQUIRED FLAT FOOT, LEFT: Primary | ICD-10-CM

## 2025-08-28 DIAGNOSIS — Q66.89 TARSAL COALITION OF LEFT FOOT: ICD-10-CM

## 2025-08-28 DIAGNOSIS — M79.605 PAIN OF LEFT LOWER EXTREMITY: ICD-10-CM

## 2025-08-28 DIAGNOSIS — M25.872 IMPINGEMENT SYNDROME OF LEFT ANKLE: ICD-10-CM

## 2025-08-28 DIAGNOSIS — Z98.890 POST-OPERATIVE STATE: ICD-10-CM

## 2025-08-28 DIAGNOSIS — Q66.6 CONGENITAL REARFOOT VALGUS: ICD-10-CM

## 2025-08-28 DIAGNOSIS — Q66.6 CONGENITAL PES PLANOVALGUS: ICD-10-CM

## (undated) DEVICE — DRESSING,GAUZE,OIL EMULSION,CURAD,3"X3": Brand: CURAD

## (undated) DEVICE — BURR: Brand: MICA

## (undated) DEVICE — GAUZE,SPONGE,FLUFF,6"X6.75",STRL,5/TRAY: Brand: MEDLINE

## (undated) DEVICE — TRAY BNE MAR CPRP BMA PLT SENS 1 BTTN AUTOMATION USED TO

## (undated) DEVICE — SINGLE PORT MANIFOLD: Brand: NEPTUNE 2

## (undated) DEVICE — CANNULATED DRILL: Brand: FIXOS

## (undated) DEVICE — BANDAGE COMPR W4INXL5YD WHT BGE POLY COT M E WRP WV HK AND

## (undated) DEVICE — ADAPTER CLEANING PORPOISE CLEANING

## (undated) DEVICE — ST CHARLES PODIATRY: Brand: MEDLINE INDUSTRIES, INC.

## (undated) DEVICE — SUTURE MONOCRYL SZ 3-0 L27IN ABSRB UD PS-2 3/8 CIR REV CUT NDL MCP427H

## (undated) DEVICE — SUTURE PDS + SZ 2 0 L27IN ABSRB VLT L36MM CT 1 1 2 CIR PDP339H

## (undated) DEVICE — 450 ML BOTTLE OF 0.05% CHLORHEXIDINE GLUCONATE IN 99.95% STERILE WATER FOR IRRIGATION, USP AND APPLICATOR.: Brand: IRRISEPT ANTIMICROBIAL WOUND LAVAGE

## (undated) DEVICE — CUTTING BURR: Brand: MICA

## (undated) DEVICE — BANDAGE,GAUZE,BULKEE II,4.5"X4.1YD,STRL: Brand: MEDLINE

## (undated) DEVICE — SUTURE PROL SZ 3-0 L18IN NONABSORBABLE BLU L24MM FS-1 3/8 8684G

## (undated) DEVICE — UNTHREADED GUIDE WIRE: Brand: FIXOS

## (undated) DEVICE — MIS STERILE INSTRUMENT PACK W/ BLADE: Brand: PROSTEP™

## (undated) DEVICE — Device: Brand: DEFENDO VALVE AND CONNECTOR KIT

## (undated) DEVICE — BLADE,CARBON-STEEL,11,STRL,DISPOSABLE,TB: Brand: MEDLINE

## (undated) DEVICE — GLOVE SURG SZ 9 THK91MIL LTX FREE SYN POLYISOPRENE ANTI

## (undated) DEVICE — PAD,ABDOMINAL,8"X7.5",ST,LF,20/BX: Brand: MEDLINE

## (undated) DEVICE — BANDAGE,ELASTIC,ESMARK,STERILE,6"X9',LF: Brand: MEDLINE

## (undated) DEVICE — TUBE IRRIGATION SURG

## (undated) DEVICE — PERRYSBURG ENDO PACK: Brand: MEDLINE INDUSTRIES, INC.

## (undated) DEVICE — CANNULATED COUNTERSINK: Brand: FIXOS

## (undated) DEVICE — NEPTUNE E-SEP SMOKE EVACUATION PENCIL, COATED, 70MM BLADE, PUSH BUTTON SWITCH: Brand: NEPTUNE E-SEP

## (undated) DEVICE — ZIMMER® STERILE DISPOSABLE TOURNIQUET CUFF WITH PLC, DUAL PORT, SINGLE BLADDER, 30 IN. (76 CM)